# Patient Record
Sex: FEMALE | Race: WHITE | NOT HISPANIC OR LATINO | Employment: FULL TIME | URBAN - METROPOLITAN AREA
[De-identification: names, ages, dates, MRNs, and addresses within clinical notes are randomized per-mention and may not be internally consistent; named-entity substitution may affect disease eponyms.]

---

## 2018-01-22 PROBLEM — O16.9 HYPERTENSION AFFECTING PREGNANCY: Status: ACTIVE | Noted: 2018-01-22

## 2018-01-24 PROBLEM — O14.90 PRE-ECLAMPSIA: Status: ACTIVE | Noted: 2018-01-24

## 2018-02-27 PROBLEM — O26.899 ABDOMINAL PAIN AFFECTING PREGNANCY: Status: ACTIVE | Noted: 2018-02-27

## 2018-02-27 PROBLEM — R10.9 ABDOMINAL PAIN AFFECTING PREGNANCY: Status: ACTIVE | Noted: 2018-02-27

## 2018-03-26 PROBLEM — Z34.90 TERM PREGNANCY: Status: ACTIVE | Noted: 2018-03-26

## 2018-03-26 PROBLEM — E66.01 MORBID OBESITY: Status: ACTIVE | Noted: 2018-03-26

## 2018-05-21 PROBLEM — N90.89 VULVAR LESION: Status: ACTIVE | Noted: 2018-05-01

## 2018-05-21 PROBLEM — Z30.2 ADMISSION FOR STERILIZATION: Status: ACTIVE | Noted: 2018-05-21

## 2021-11-08 ENCOUNTER — OFFICE VISIT (OUTPATIENT)
Dept: ORTHOPEDICS | Facility: CLINIC | Age: 31
End: 2021-11-08
Payer: MEDICAID

## 2021-11-08 VITALS
HEART RATE: 103 BPM | HEIGHT: 70 IN | DIASTOLIC BLOOD PRESSURE: 88 MMHG | SYSTOLIC BLOOD PRESSURE: 138 MMHG | BODY MASS INDEX: 41.95 KG/M2 | WEIGHT: 293 LBS

## 2021-11-08 DIAGNOSIS — M23.92 INTERNAL DERANGEMENT OF LEFT KNEE: Primary | ICD-10-CM

## 2021-11-08 PROCEDURE — 99204 OFFICE O/P NEW MOD 45 MIN: CPT | Mod: S$PBB,,, | Performed by: ORTHOPAEDIC SURGERY

## 2021-11-08 PROCEDURE — 99999 PR PBB SHADOW E&M-EST. PATIENT-LVL III: ICD-10-PCS | Mod: PBBFAC,,, | Performed by: ORTHOPAEDIC SURGERY

## 2021-11-08 PROCEDURE — 99204 PR OFFICE/OUTPT VISIT, NEW, LEVL IV, 45-59 MIN: ICD-10-PCS | Mod: S$PBB,,, | Performed by: ORTHOPAEDIC SURGERY

## 2021-11-08 PROCEDURE — 99213 OFFICE O/P EST LOW 20 MIN: CPT | Mod: PBBFAC,PN | Performed by: ORTHOPAEDIC SURGERY

## 2021-11-08 PROCEDURE — 99999 PR PBB SHADOW E&M-EST. PATIENT-LVL III: CPT | Mod: PBBFAC,,, | Performed by: ORTHOPAEDIC SURGERY

## 2021-11-08 RX ORDER — HYDROCODONE BITARTRATE AND ACETAMINOPHEN 5; 325 MG/1; MG/1
1 TABLET ORAL EVERY 6 HOURS
COMMUNITY
Start: 2021-11-05 | End: 2021-12-06

## 2021-11-24 ENCOUNTER — OFFICE VISIT (OUTPATIENT)
Dept: ORTHOPEDICS | Facility: CLINIC | Age: 31
End: 2021-11-24
Payer: MEDICAID

## 2021-11-24 ENCOUNTER — PATIENT MESSAGE (OUTPATIENT)
Dept: ORTHOPEDICS | Facility: CLINIC | Age: 31
End: 2021-11-24

## 2021-11-24 DIAGNOSIS — S83.512A RUPTURE OF ANTERIOR CRUCIATE LIGAMENT OF LEFT KNEE, INITIAL ENCOUNTER: Primary | ICD-10-CM

## 2021-11-24 DIAGNOSIS — S83.272A COMPLEX TEAR OF LATERAL MENISCUS OF LEFT KNEE AS CURRENT INJURY, INITIAL ENCOUNTER: ICD-10-CM

## 2021-11-24 PROCEDURE — 99214 OFFICE O/P EST MOD 30 MIN: CPT | Mod: 95,,, | Performed by: ORTHOPAEDIC SURGERY

## 2021-11-24 PROCEDURE — 99214 PR OFFICE/OUTPT VISIT, EST, LEVL IV, 30-39 MIN: ICD-10-PCS | Mod: 95,,, | Performed by: ORTHOPAEDIC SURGERY

## 2021-11-26 ENCOUNTER — TELEPHONE (OUTPATIENT)
Dept: ORTHOPEDICS | Facility: CLINIC | Age: 31
End: 2021-11-26
Payer: MEDICAID

## 2021-11-26 ENCOUNTER — PATIENT MESSAGE (OUTPATIENT)
Dept: NEUROLOGY | Facility: CLINIC | Age: 31
End: 2021-11-26
Payer: MEDICAID

## 2021-12-06 ENCOUNTER — TELEPHONE (OUTPATIENT)
Dept: ORTHOPEDICS | Facility: CLINIC | Age: 31
End: 2021-12-06
Payer: MEDICAID

## 2021-12-06 ENCOUNTER — OFFICE VISIT (OUTPATIENT)
Dept: ORTHOPEDICS | Facility: CLINIC | Age: 31
End: 2021-12-06
Payer: MEDICAID

## 2021-12-06 ENCOUNTER — HOSPITAL ENCOUNTER (OUTPATIENT)
Dept: RADIOLOGY | Facility: HOSPITAL | Age: 31
Discharge: HOME | End: 2021-12-06
Attending: ORTHOPAEDIC SURGERY
Payer: MEDICAID

## 2021-12-06 VITALS
DIASTOLIC BLOOD PRESSURE: 84 MMHG | BODY MASS INDEX: 41.95 KG/M2 | HEIGHT: 70 IN | SYSTOLIC BLOOD PRESSURE: 139 MMHG | WEIGHT: 293 LBS | HEART RATE: 88 BPM

## 2021-12-06 DIAGNOSIS — Z01.818 PREOP EXAMINATION: ICD-10-CM

## 2021-12-06 DIAGNOSIS — S83.262D PERIPHERAL TEAR OF LATERAL MENISCUS OF LEFT KNEE AS CURRENT INJURY, SUBSEQUENT ENCOUNTER: ICD-10-CM

## 2021-12-06 DIAGNOSIS — S83.512D RUPTURE OF ANTERIOR CRUCIATE LIGAMENT OF LEFT KNEE, SUBSEQUENT ENCOUNTER: Primary | ICD-10-CM

## 2021-12-06 DIAGNOSIS — M25.562 LEFT KNEE PAIN, UNSPECIFIED CHRONICITY: Primary | ICD-10-CM

## 2021-12-06 PROCEDURE — 99999 PR PBB SHADOW E&M-EST. PATIENT-LVL IV: ICD-10-PCS | Mod: PBBFAC,,, | Performed by: ORTHOPAEDIC SURGERY

## 2021-12-06 PROCEDURE — 99214 OFFICE O/P EST MOD 30 MIN: CPT | Mod: S$PBB,,, | Performed by: ORTHOPAEDIC SURGERY

## 2021-12-06 PROCEDURE — 99214 PR OFFICE/OUTPT VISIT, EST, LEVL IV, 30-39 MIN: ICD-10-PCS | Mod: S$PBB,,, | Performed by: ORTHOPAEDIC SURGERY

## 2021-12-06 PROCEDURE — 71045 X-RAY EXAM CHEST 1 VIEW: CPT | Mod: TC,FY

## 2021-12-06 PROCEDURE — 71045 X-RAY EXAM CHEST 1 VIEW: CPT | Mod: 26,,, | Performed by: STUDENT IN AN ORGANIZED HEALTH CARE EDUCATION/TRAINING PROGRAM

## 2021-12-06 PROCEDURE — 71045 XR CHEST 1 VIEW PRE-OP: ICD-10-PCS | Mod: 26,,, | Performed by: STUDENT IN AN ORGANIZED HEALTH CARE EDUCATION/TRAINING PROGRAM

## 2021-12-06 PROCEDURE — 99214 OFFICE O/P EST MOD 30 MIN: CPT | Mod: PBBFAC,25,PN | Performed by: ORTHOPAEDIC SURGERY

## 2021-12-06 PROCEDURE — 99999 PR PBB SHADOW E&M-EST. PATIENT-LVL IV: CPT | Mod: PBBFAC,,, | Performed by: ORTHOPAEDIC SURGERY

## 2021-12-21 ENCOUNTER — ANESTHESIA EVENT (OUTPATIENT)
Dept: SURGERY | Facility: HOSPITAL | Age: 31
End: 2021-12-21
Payer: MEDICAID

## 2021-12-21 ENCOUNTER — HOSPITAL ENCOUNTER (OUTPATIENT)
Dept: PREADMISSION TESTING | Facility: HOSPITAL | Age: 31
Discharge: HOME | End: 2021-12-21
Attending: ORTHOPAEDIC SURGERY
Payer: MEDICAID

## 2021-12-21 VITALS — HEART RATE: 80 BPM | DIASTOLIC BLOOD PRESSURE: 71 MMHG | SYSTOLIC BLOOD PRESSURE: 118 MMHG | OXYGEN SATURATION: 94 %

## 2021-12-21 RX ORDER — SCOLOPAMINE TRANSDERMAL SYSTEM 1 MG/1
1 PATCH, EXTENDED RELEASE TRANSDERMAL
Status: CANCELLED | OUTPATIENT
Start: 2021-12-21

## 2021-12-21 RX ORDER — LIDOCAINE HYDROCHLORIDE 10 MG/ML
1 INJECTION, SOLUTION EPIDURAL; INFILTRATION; INTRACAUDAL; PERINEURAL ONCE
Status: CANCELLED | OUTPATIENT
Start: 2021-12-21 | End: 2021-12-21

## 2021-12-21 RX ORDER — LIDOCAINE HYDROCHLORIDE 10 MG/ML
1 INJECTION, SOLUTION EPIDURAL; INFILTRATION; INTRACAUDAL; PERINEURAL ONCE AS NEEDED
Status: CANCELLED | OUTPATIENT
Start: 2021-12-21 | End: 2033-05-19

## 2021-12-21 RX ORDER — SODIUM CHLORIDE, SODIUM LACTATE, POTASSIUM CHLORIDE, CALCIUM CHLORIDE 600; 310; 30; 20 MG/100ML; MG/100ML; MG/100ML; MG/100ML
INJECTION, SOLUTION INTRAVENOUS CONTINUOUS
Status: CANCELLED | OUTPATIENT
Start: 2021-12-21

## 2021-12-26 ENCOUNTER — LAB VISIT (OUTPATIENT)
Dept: FAMILY MEDICINE | Facility: CLINIC | Age: 31
End: 2021-12-26
Payer: MEDICAID

## 2021-12-26 DIAGNOSIS — Z01.818 PRE-OP TESTING: ICD-10-CM

## 2021-12-26 PROCEDURE — U0003 INFECTIOUS AGENT DETECTION BY NUCLEIC ACID (DNA OR RNA); SEVERE ACUTE RESPIRATORY SYNDROME CORONAVIRUS 2 (SARS-COV-2) (CORONAVIRUS DISEASE [COVID-19]), AMPLIFIED PROBE TECHNIQUE, MAKING USE OF HIGH THROUGHPUT TECHNOLOGIES AS DESCRIBED BY CMS-2020-01-R: HCPCS | Performed by: ORTHOPAEDIC SURGERY

## 2021-12-26 PROCEDURE — U0005 INFEC AGEN DETEC AMPLI PROBE: HCPCS | Performed by: ORTHOPAEDIC SURGERY

## 2021-12-27 LAB
SARS-COV-2 RNA RESP QL NAA+PROBE: NOT DETECTED
SARS-COV-2- CYCLE NUMBER: NORMAL

## 2021-12-27 NOTE — H&P
Subjective:   Ava Parikh is a 31 y.o. female who returns for evaluation and treatment of the left knee.     The patient has the following symptoms: giving out and pain located Anterior and lateral knee. The symptoms are not improving.  The patient was previously seen and evaluated for an ACL rupture.  Patient was sent for advanced imaging and this confirmed an ACL rupture with a lateral meniscus injury.  Today the patient complains of knee giving way episodes when pivoting on the left lower extremity.  This subsequently results and tremendous knee pain and swelling.  Since last office visit the patient has improved her knee range of motion and the swelling has decreased.        Outside reports reviewed: historical medical records and office notes.          Past Medical History:   Diagnosis Date    Admission for sterilization 05/2018    Gastroparesis 05/2018    History of pre-eclampsia      Hypertension      Vulvar lesion 05/2018             Patient Active Problem List   Diagnosis    Hypertension affecting pregnancy    Pre-eclampsia    Abdominal pain affecting pregnancy    Term pregnancy    Morbid obesity    Admission for sterilization    Vulvar lesion    Internal derangement of left knee    Left anterior cruciate ligament tear    Complex tear of lateral meniscus of left knee as current injury               Past Surgical History:   Procedure Laterality Date    ESOPHAGOGASTRODUODENOSCOPY         x 2    TONSILLECTOMY, ADENOIDECTOMY             No current outpatient medications            Review of patient's allergies indicates:   Allergen Reactions    Sulfamethoxazole-trimethoprim Rash       Other reaction(s): Hives         Social History               Socioeconomic History    Marital status: Single   Tobacco Use    Smoking status: Never Smoker    Smokeless tobacco: Never Used   Substance and Sexual Activity    Alcohol use: No    Drug use: No    Sexual activity: Yes       Partners: Male                   Family History   Problem Relation Age of Onset    Hypertension Mother      Asthma Mother            Review of Systems   Constitutional: Negative for chills and fever.   HENT: Negative for hearing loss.    Eyes: Negative for blurred vision.   Cardiovascular: Negative for chest pain.   Respiratory: Negative for shortness of breath.    Gastrointestinal: Negative for abdominal pain.   Neurological: Negative for light-headedness.         Objective:   General     Constitutional: She is oriented to person, place, and time. She appears well-developed and well-nourished.   HENT:   Head: Normocephalic and atraumatic.   Eyes: EOM are normal.   Cardiovascular: Normal rate.    Pulmonary/Chest: Effort normal.   Neurological: She is alert and oriented to person, place, and time.   Psychiatric: She has a normal mood and affect.               Right Knee Exam      Inspection   Erythema: absent  Swelling: absent  Effusion: absent     Tenderness   The patient is experiencing no tenderness.      Range of Motion   Extension: 0   Flexion: 130      Tests   Meniscus   [place]:  Medial - negative Lateral - negative  Ligament Examination Lachman: normal (-1 to 2mm) PCL-Posterior Drawer: normal (0 to 2mm)     MCL - Valgus: normal (0 to 2mm)  LCL - Varus: normal  Patella   Patellar apprehension: negative     Other   Sensation: normal     Left Knee Exam      Inspection   Erythema: absent  Swelling: absent  Effusion: present     Tenderness   The patient tender to palpation of the medial joint line.     Range of Motion   Extension: 0   Flexion: 100      Tests   Stability Lachman: abnormal   MCL - Valgus: normal (0 to 2mm)  LCL - Varus: normal (0 to 2mm)Pivot Shift: abnormal  Patella   Patellar apprehension: negative     Other   Sensation: normal     Muscle Strength   Right Lower Extremity   Quadriceps:  5/5   Hamstrin/5   Left Lower Extremity   Quadriceps:  5/5   Hamstrin/5      Vascular Exam      Right  Pulses     Posterior Tibial:      2+           Left Pulses     Posterior Tibial:      2+                 Imaging:  Radiographs of the left knee taken taken 10/30/2021 were personally reviewed from the [facility]MR.  Multiple views of the knee are available today for review, including a standing AP, a standing notch view, lateral view, and a merchant view.  The tibiofemoral compartment demonstrates minimal degenerative changes.  The patellofemoral compartment demonstrates minimal degenerative changes.  No acute fractures or dislocations are noted in these images.        MRI of the left knee taken taken 11/22/2021 was personally reviewed from the [facility].  Multiple T1 and T2 sequences including axial, coronal, and sagittal views were reviewed.  No acute fractures or dislocations are noted in these images.  The MRI demonstrates a empty lateral wall sign at the insertion of the ACL on the femur.  This is consistent with an ACL rupture.  The PCL is intact.  The medial meniscus is intact.  The lateral meniscus shows a degenerative tear at the body of the meniscus.  The medial and lateral collateral ligament complexes are intact.  The chondral surfaces are intact.  The quadriceps and patellar tendons are intact.  There is a trace effusion noted.     Procedures         Assessment:       Ava Parikh is a 31 y.o. female seen in the office today. The primary encounter diagnosis was Rupture of anterior cruciate ligament of left knee, subsequent encounter. Diagnoses of Preop examination and Peripheral tear of lateral meniscus of left knee as current injury, subsequent encounter were also pertinent to this visit.  Operative treatment with knee surgery is recommended at this time.  I had a long discussion with the patient regarding her knee problem.  Given her desire to maintain an active lifestyle I have recommended proceeding with left knee arthroscopic ACL reconstruction.  We discussed graft types and the  patient selected quadriceps tendon autograft.  We will evaluate the condition of the lateral meniscus tear intraoperatively and adjust her postoperative course as indicated.  The natural history and expected course discussed with patient. Various treatment options were discussed, including their risks and benefits. All of the patient's questions were answered.     Plan:   1. Tylenol 650mg TID, PRN pain.  2. Surgical treatment left ACL reconstruction with quad tendon autograft, possible meniscus surgery.  3. Surgical consent for surgery obtained today during office visit.  4. Expected date of surgery: 12/28/2021.  5. Patient will have her primary care physician send us her recent laboratory studies prior to surgery.  6. Post operative physical therapy ordered.

## 2021-12-28 ENCOUNTER — ANESTHESIA (OUTPATIENT)
Dept: SURGERY | Facility: HOSPITAL | Age: 31
End: 2021-12-28
Payer: MEDICAID

## 2021-12-28 ENCOUNTER — HOSPITAL ENCOUNTER (OUTPATIENT)
Facility: HOSPITAL | Age: 31
Discharge: HOME | End: 2021-12-28
Attending: ORTHOPAEDIC SURGERY | Admitting: ORTHOPAEDIC SURGERY
Payer: MEDICAID

## 2021-12-28 VITALS
WEIGHT: 293 LBS | HEIGHT: 70 IN | OXYGEN SATURATION: 96 % | SYSTOLIC BLOOD PRESSURE: 131 MMHG | HEART RATE: 97 BPM | RESPIRATION RATE: 19 BRPM | TEMPERATURE: 98 F | DIASTOLIC BLOOD PRESSURE: 76 MMHG | BODY MASS INDEX: 41.95 KG/M2

## 2021-12-28 DIAGNOSIS — S83.272A COMPLEX TEAR OF LATERAL MENISCUS OF LEFT KNEE AS CURRENT INJURY, INITIAL ENCOUNTER: ICD-10-CM

## 2021-12-28 DIAGNOSIS — S83.512A RUPTURE OF ANTERIOR CRUCIATE LIGAMENT OF LEFT KNEE, INITIAL ENCOUNTER: Primary | ICD-10-CM

## 2021-12-28 DIAGNOSIS — M22.42 CHONDROMALACIA, PATELLA, LEFT: ICD-10-CM

## 2021-12-28 DIAGNOSIS — E66.01 MORBID OBESITY: ICD-10-CM

## 2021-12-28 DIAGNOSIS — S83.512A LEFT ANTERIOR CRUCIATE LIGAMENT TEAR: ICD-10-CM

## 2021-12-28 LAB
B-HCG UR QL: NEGATIVE
CTP QC/QA: YES

## 2021-12-28 PROCEDURE — 01400 ANES OPN/ARTHRS KNEE JT NOS: CPT | Performed by: ORTHOPAEDIC SURGERY

## 2021-12-28 PROCEDURE — 71000033 HC RECOVERY, INTIAL HOUR: Performed by: ORTHOPAEDIC SURGERY

## 2021-12-28 PROCEDURE — 76942 ECHO GUIDE FOR BIOPSY: CPT | Performed by: STUDENT IN AN ORGANIZED HEALTH CARE EDUCATION/TRAINING PROGRAM

## 2021-12-28 PROCEDURE — 25000003 PHARM REV CODE 250: Performed by: ORTHOPAEDIC SURGERY

## 2021-12-28 PROCEDURE — 63600175 PHARM REV CODE 636 W HCPCS: Performed by: STUDENT IN AN ORGANIZED HEALTH CARE EDUCATION/TRAINING PROGRAM

## 2021-12-28 PROCEDURE — 37000008 HC ANESTHESIA 1ST 15 MINUTES: Performed by: ORTHOPAEDIC SURGERY

## 2021-12-28 PROCEDURE — 27201423 OPTIME MED/SURG SUP & DEVICES STERILE SUPPLY: Performed by: ORTHOPAEDIC SURGERY

## 2021-12-28 PROCEDURE — C9290 INJ, BUPIVACAINE LIPOSOME: HCPCS | Performed by: ORTHOPAEDIC SURGERY

## 2021-12-28 PROCEDURE — 25000003 PHARM REV CODE 250

## 2021-12-28 PROCEDURE — 63600175 PHARM REV CODE 636 W HCPCS: Performed by: NURSE PRACTITIONER

## 2021-12-28 PROCEDURE — 25000003 PHARM REV CODE 250: Performed by: NURSE PRACTITIONER

## 2021-12-28 PROCEDURE — C1713 ANCHOR/SCREW BN/BN,TIS/BN: HCPCS | Performed by: ORTHOPAEDIC SURGERY

## 2021-12-28 PROCEDURE — 37000009 HC ANESTHESIA EA ADD 15 MINS: Performed by: ORTHOPAEDIC SURGERY

## 2021-12-28 PROCEDURE — 25000003 PHARM REV CODE 250: Performed by: STUDENT IN AN ORGANIZED HEALTH CARE EDUCATION/TRAINING PROGRAM

## 2021-12-28 PROCEDURE — 81025 URINE PREGNANCY TEST: CPT | Performed by: ORTHOPAEDIC SURGERY

## 2021-12-28 PROCEDURE — 63600175 PHARM REV CODE 636 W HCPCS

## 2021-12-28 PROCEDURE — 63600175 PHARM REV CODE 636 W HCPCS: Performed by: ORTHOPAEDIC SURGERY

## 2021-12-28 PROCEDURE — 36000711: Performed by: ORTHOPAEDIC SURGERY

## 2021-12-28 PROCEDURE — 29888 ARTHRS AID ACL RPR/AGMNTJ: CPT | Mod: 22,LT,, | Performed by: ORTHOPAEDIC SURGERY

## 2021-12-28 PROCEDURE — 71000039 HC RECOVERY, EACH ADD'L HOUR: Performed by: ORTHOPAEDIC SURGERY

## 2021-12-28 PROCEDURE — 01400 ANES OPN/ARTHRS KNEE JT NOS: CPT | Performed by: STUDENT IN AN ORGANIZED HEALTH CARE EDUCATION/TRAINING PROGRAM

## 2021-12-28 PROCEDURE — 36000710: Performed by: ORTHOPAEDIC SURGERY

## 2021-12-28 PROCEDURE — 71000016 HC POSTOP RECOV ADDL HR: Performed by: ORTHOPAEDIC SURGERY

## 2021-12-28 PROCEDURE — 71000015 HC POSTOP RECOV 1ST HR: Performed by: ORTHOPAEDIC SURGERY

## 2021-12-28 PROCEDURE — 29888 PR KNEE SCOPE,AID ANT CRUCIATE REPAIR: ICD-10-PCS | Mod: 22,LT,, | Performed by: ORTHOPAEDIC SURGERY

## 2021-12-28 DEVICE — TIGHTROPE ACL RT: Type: IMPLANTABLE DEVICE | Site: KNEE | Status: FUNCTIONAL

## 2021-12-28 DEVICE — BUTTON TIGHTROPE ABS RND 20MM: Type: IMPLANTABLE DEVICE | Site: KNEE | Status: FUNCTIONAL

## 2021-12-28 DEVICE — SYS SWIVELOCK ANCH 4.75X19.1MM: Type: IMPLANTABLE DEVICE | Site: KNEE | Status: FUNCTIONAL

## 2021-12-28 RX ORDER — BUPIVACAINE HYDROCHLORIDE 5 MG/ML
INJECTION, SOLUTION PERINEURAL
Status: DISCONTINUED | OUTPATIENT
Start: 2021-12-28 | End: 2021-12-28 | Stop reason: HOSPADM

## 2021-12-28 RX ORDER — NEOSTIGMINE METHYLSULFATE 1 MG/ML
INJECTION, SOLUTION INTRAVENOUS
Status: DISCONTINUED | OUTPATIENT
Start: 2021-12-28 | End: 2021-12-28

## 2021-12-28 RX ORDER — GABAPENTIN 300 MG/1
300 CAPSULE ORAL NIGHTLY
Qty: 7 CAPSULE | Refills: 0 | Status: SHIPPED | OUTPATIENT
Start: 2021-12-28 | End: 2022-01-12

## 2021-12-28 RX ORDER — PREGABALIN 75 MG/1
300 CAPSULE ORAL
Status: COMPLETED | OUTPATIENT
Start: 2021-12-28 | End: 2021-12-28

## 2021-12-28 RX ORDER — LIDOCAINE HYDROCHLORIDE AND EPINEPHRINE 10; 10 MG/ML; UG/ML
INJECTION, SOLUTION INFILTRATION; PERINEURAL
Status: DISCONTINUED | OUTPATIENT
Start: 2021-12-28 | End: 2021-12-28 | Stop reason: HOSPADM

## 2021-12-28 RX ORDER — OXYCODONE HYDROCHLORIDE 5 MG/1
5 TABLET ORAL EVERY 4 HOURS PRN
Qty: 30 TABLET | Refills: 0 | Status: SHIPPED | OUTPATIENT
Start: 2021-12-28 | End: 2022-01-12

## 2021-12-28 RX ORDER — PHENYLEPHRINE HYDROCHLORIDE 10 MG/ML
INJECTION INTRAVENOUS
Status: DISCONTINUED | OUTPATIENT
Start: 2021-12-28 | End: 2021-12-28

## 2021-12-28 RX ORDER — ASPIRIN 81 MG/1
81 TABLET ORAL DAILY
Qty: 30 TABLET | Refills: 0 | Status: SHIPPED | OUTPATIENT
Start: 2021-12-28 | End: 2022-02-09

## 2021-12-28 RX ORDER — SCOLOPAMINE TRANSDERMAL SYSTEM 1 MG/1
1 PATCH, EXTENDED RELEASE TRANSDERMAL
Status: DISCONTINUED | OUTPATIENT
Start: 2021-12-28 | End: 2021-12-28 | Stop reason: HOSPADM

## 2021-12-28 RX ORDER — ROCURONIUM BROMIDE 10 MG/ML
INJECTION, SOLUTION INTRAVENOUS
Status: DISCONTINUED | OUTPATIENT
Start: 2021-12-28 | End: 2021-12-28

## 2021-12-28 RX ORDER — FENTANYL CITRATE 50 UG/ML
INJECTION, SOLUTION INTRAMUSCULAR; INTRAVENOUS
Status: DISCONTINUED | OUTPATIENT
Start: 2021-12-28 | End: 2021-12-28

## 2021-12-28 RX ORDER — OXYCODONE HYDROCHLORIDE 5 MG/1
5 TABLET ORAL
Status: DISCONTINUED | OUTPATIENT
Start: 2021-12-28 | End: 2021-12-28 | Stop reason: HOSPADM

## 2021-12-28 RX ORDER — MIDAZOLAM HYDROCHLORIDE 1 MG/ML
INJECTION, SOLUTION INTRAMUSCULAR; INTRAVENOUS
Status: DISCONTINUED | OUTPATIENT
Start: 2021-12-28 | End: 2021-12-28

## 2021-12-28 RX ORDER — HYDROMORPHONE HYDROCHLORIDE 2 MG/ML
INJECTION, SOLUTION INTRAMUSCULAR; INTRAVENOUS; SUBCUTANEOUS
Status: COMPLETED
Start: 2021-12-28 | End: 2021-12-28

## 2021-12-28 RX ORDER — OXYCODONE HYDROCHLORIDE 5 MG/1
TABLET ORAL
Status: COMPLETED
Start: 2021-12-28 | End: 2021-12-28

## 2021-12-28 RX ORDER — DIPHENHYDRAMINE HYDROCHLORIDE 50 MG/ML
12.5 INJECTION INTRAMUSCULAR; INTRAVENOUS EVERY 6 HOURS PRN
Status: DISCONTINUED | OUTPATIENT
Start: 2021-12-28 | End: 2021-12-28 | Stop reason: HOSPADM

## 2021-12-28 RX ORDER — SODIUM CHLORIDE, SODIUM LACTATE, POTASSIUM CHLORIDE, CALCIUM CHLORIDE 600; 310; 30; 20 MG/100ML; MG/100ML; MG/100ML; MG/100ML
INJECTION, SOLUTION INTRAVENOUS CONTINUOUS
Status: DISCONTINUED | OUTPATIENT
Start: 2021-12-28 | End: 2021-12-28 | Stop reason: HOSPADM

## 2021-12-28 RX ORDER — SODIUM CHLORIDE 0.9 % (FLUSH) 0.9 %
10 SYRINGE (ML) INJECTION
Status: DISCONTINUED | OUTPATIENT
Start: 2021-12-28 | End: 2021-12-28 | Stop reason: HOSPADM

## 2021-12-28 RX ORDER — LIDOCAINE HYDROCHLORIDE 20 MG/ML
INJECTION INTRAVENOUS
Status: DISCONTINUED | OUTPATIENT
Start: 2021-12-28 | End: 2021-12-28

## 2021-12-28 RX ORDER — ONDANSETRON 2 MG/ML
4 INJECTION INTRAMUSCULAR; INTRAVENOUS DAILY PRN
Status: DISCONTINUED | OUTPATIENT
Start: 2021-12-28 | End: 2021-12-28 | Stop reason: HOSPADM

## 2021-12-28 RX ORDER — LIDOCAINE HYDROCHLORIDE 10 MG/ML
1 INJECTION, SOLUTION EPIDURAL; INFILTRATION; INTRACAUDAL; PERINEURAL ONCE
Status: DISCONTINUED | OUTPATIENT
Start: 2021-12-28 | End: 2021-12-28 | Stop reason: HOSPADM

## 2021-12-28 RX ORDER — ACETAMINOPHEN 500 MG
1000 TABLET ORAL
Status: COMPLETED | OUTPATIENT
Start: 2021-12-28 | End: 2021-12-28

## 2021-12-28 RX ORDER — CEFAZOLIN SODIUM 2 G/50ML
2 SOLUTION INTRAVENOUS
Status: DISCONTINUED | OUTPATIENT
Start: 2021-12-28 | End: 2021-12-28 | Stop reason: HOSPADM

## 2021-12-28 RX ORDER — CEFAZOLIN SODIUM 1 G/3ML
INJECTION, POWDER, FOR SOLUTION INTRAMUSCULAR; INTRAVENOUS
Status: DISCONTINUED | OUTPATIENT
Start: 2021-12-28 | End: 2021-12-28

## 2021-12-28 RX ORDER — DEXAMETHASONE SODIUM PHOSPHATE 4 MG/ML
INJECTION, SOLUTION INTRA-ARTICULAR; INTRALESIONAL; INTRAMUSCULAR; INTRAVENOUS; SOFT TISSUE
Status: DISCONTINUED | OUTPATIENT
Start: 2021-12-28 | End: 2021-12-28

## 2021-12-28 RX ORDER — ACETAMINOPHEN 500 MG
1000 TABLET ORAL EVERY 8 HOURS
Qty: 42 TABLET | Refills: 0 | Status: SHIPPED | OUTPATIENT
Start: 2021-12-28 | End: 2022-01-04

## 2021-12-28 RX ORDER — CELECOXIB 100 MG/1
200 CAPSULE ORAL
Status: COMPLETED | OUTPATIENT
Start: 2021-12-28 | End: 2021-12-28

## 2021-12-28 RX ORDER — HYDROMORPHONE HYDROCHLORIDE 2 MG/ML
0.5 INJECTION, SOLUTION INTRAMUSCULAR; INTRAVENOUS; SUBCUTANEOUS EVERY 5 MIN PRN
Status: DISCONTINUED | OUTPATIENT
Start: 2021-12-28 | End: 2021-12-28 | Stop reason: HOSPADM

## 2021-12-28 RX ORDER — SODIUM CHLORIDE 9 MG/ML
INJECTION, SOLUTION INTRAVENOUS CONTINUOUS
Status: DISCONTINUED | OUTPATIENT
Start: 2021-12-28 | End: 2021-12-28 | Stop reason: HOSPADM

## 2021-12-28 RX ORDER — ACETAMINOPHEN 500 MG
1000 TABLET ORAL EVERY 8 HOURS
Qty: 42 TABLET | Refills: 0 | Status: SHIPPED | OUTPATIENT
Start: 2021-12-28 | End: 2021-12-28 | Stop reason: SDUPTHER

## 2021-12-28 RX ORDER — LIDOCAINE HYDROCHLORIDE 10 MG/ML
1 INJECTION, SOLUTION EPIDURAL; INFILTRATION; INTRACAUDAL; PERINEURAL ONCE AS NEEDED
Status: DISCONTINUED | OUTPATIENT
Start: 2021-12-28 | End: 2021-12-28 | Stop reason: HOSPADM

## 2021-12-28 RX ORDER — ROPIVACAINE HYDROCHLORIDE 2 MG/ML
INJECTION, SOLUTION EPIDURAL; INFILTRATION; PERINEURAL
Status: DISCONTINUED | OUTPATIENT
Start: 2021-12-28 | End: 2021-12-28

## 2021-12-28 RX ORDER — CELECOXIB 200 MG/1
200 CAPSULE ORAL 2 TIMES DAILY
Qty: 28 CAPSULE | Refills: 0 | Status: SHIPPED | OUTPATIENT
Start: 2021-12-28 | End: 2022-01-03

## 2021-12-28 RX ORDER — KETOROLAC TROMETHAMINE 30 MG/ML
INJECTION, SOLUTION INTRAMUSCULAR; INTRAVENOUS
Status: DISCONTINUED | OUTPATIENT
Start: 2021-12-28 | End: 2021-12-28

## 2021-12-28 RX ORDER — PROPOFOL 10 MG/ML
VIAL (ML) INTRAVENOUS
Status: DISCONTINUED | OUTPATIENT
Start: 2021-12-28 | End: 2021-12-28

## 2021-12-28 RX ADMIN — PROPOFOL 200 MG: 10 INJECTION, EMULSION INTRAVENOUS at 10:12

## 2021-12-28 RX ADMIN — ONDANSETRON 4 MG: 2 INJECTION, SOLUTION INTRAMUSCULAR; INTRAVENOUS at 02:12

## 2021-12-28 RX ADMIN — SODIUM CHLORIDE, SODIUM LACTATE, POTASSIUM CHLORIDE, AND CALCIUM CHLORIDE: .6; .31; .03; .02 INJECTION, SOLUTION INTRAVENOUS at 09:12

## 2021-12-28 RX ADMIN — CEFAZOLIN 3 G: 330 INJECTION, POWDER, FOR SOLUTION INTRAMUSCULAR; INTRAVENOUS at 10:12

## 2021-12-28 RX ADMIN — KETOROLAC TROMETHAMINE 30 MG: 30 INJECTION, SOLUTION INTRAMUSCULAR; INTRAVENOUS at 02:12

## 2021-12-28 RX ADMIN — GLYCOPYRROLATE 0.3 MG: 0.2 INJECTION, SOLUTION INTRAMUSCULAR; INTRAVITREAL at 02:12

## 2021-12-28 RX ADMIN — PREGABALIN 300 MG: 75 CAPSULE ORAL at 07:12

## 2021-12-28 RX ADMIN — FENTANYL CITRATE 50 MCG: 50 INJECTION, SOLUTION INTRAMUSCULAR; INTRAVENOUS at 02:12

## 2021-12-28 RX ADMIN — HYDROMORPHONE HYDROCHLORIDE: 2 INJECTION INTRAMUSCULAR; INTRAVENOUS; SUBCUTANEOUS at 03:12

## 2021-12-28 RX ADMIN — ROPIVACAINE HYDROCHLORIDE 20 ML: 2 INJECTION, SOLUTION EPIDURAL; INFILTRATION at 10:12

## 2021-12-28 RX ADMIN — PHENYLEPHRINE HYDROCHLORIDE 200 MCG: 10 INJECTION INTRAVENOUS at 10:12

## 2021-12-28 RX ADMIN — FENTANYL CITRATE 50 MCG: 50 INJECTION, SOLUTION INTRAMUSCULAR; INTRAVENOUS at 01:12

## 2021-12-28 RX ADMIN — ROCURONIUM BROMIDE 50 MG: 10 INJECTION, SOLUTION INTRAVENOUS at 10:12

## 2021-12-28 RX ADMIN — ACETAMINOPHEN 1000 MG: 500 TABLET ORAL at 07:12

## 2021-12-28 RX ADMIN — CELECOXIB 200 MG: 100 CAPSULE ORAL at 07:12

## 2021-12-28 RX ADMIN — ROCURONIUM BROMIDE 15 MG: 10 INJECTION, SOLUTION INTRAVENOUS at 11:12

## 2021-12-28 RX ADMIN — SCOPALAMINE 1 PATCH: 1 PATCH, EXTENDED RELEASE TRANSDERMAL at 07:12

## 2021-12-28 RX ADMIN — OXYCODONE HYDROCHLORIDE: 5 TABLET ORAL at 04:12

## 2021-12-28 RX ADMIN — PHENYLEPHRINE HYDROCHLORIDE 100 MCG: 10 INJECTION INTRAVENOUS at 11:12

## 2021-12-28 RX ADMIN — CEFAZOLIN 3 G: 330 INJECTION, POWDER, FOR SOLUTION INTRAMUSCULAR; INTRAVENOUS at 01:12

## 2021-12-28 RX ADMIN — PHENYLEPHRINE HYDROCHLORIDE 50 MCG: 10 INJECTION INTRAVENOUS at 12:12

## 2021-12-28 RX ADMIN — HYDROMORPHONE HYDROCHLORIDE 0.5 MG: 2 INJECTION INTRAMUSCULAR; INTRAVENOUS; SUBCUTANEOUS at 03:12

## 2021-12-28 RX ADMIN — DEXAMETHASONE SODIUM PHOSPHATE 8 MG: 4 INJECTION, SOLUTION INTRA-ARTICULAR; INTRALESIONAL; INTRAMUSCULAR; INTRAVENOUS; SOFT TISSUE at 10:12

## 2021-12-28 RX ADMIN — FENTANYL CITRATE 25 MCG: 50 INJECTION, SOLUTION INTRAMUSCULAR; INTRAVENOUS at 10:12

## 2021-12-28 RX ADMIN — NEOSTIGMINE METHYLSULFATE 2.5 MG: 1 INJECTION INTRAVENOUS at 02:12

## 2021-12-28 RX ADMIN — PHENYLEPHRINE HYDROCHLORIDE 100 MCG: 10 INJECTION INTRAVENOUS at 10:12

## 2021-12-28 RX ADMIN — ROCURONIUM BROMIDE 20 MG: 10 INJECTION, SOLUTION INTRAVENOUS at 12:12

## 2021-12-28 RX ADMIN — MIDAZOLAM 5 MG: 1 INJECTION INTRAMUSCULAR; INTRAVENOUS at 10:12

## 2021-12-28 RX ADMIN — LIDOCAINE HYDROCHLORIDE 100 MG: 20 INJECTION, SOLUTION INTRAVENOUS at 10:12

## 2021-12-28 RX ADMIN — FENTANYL CITRATE 75 MCG: 50 INJECTION, SOLUTION INTRAMUSCULAR; INTRAVENOUS at 10:12

## 2021-12-28 NOTE — PLAN OF CARE
Patient getting dressed with significant other, will given instructions and discharge soon, vss, no issues

## 2021-12-28 NOTE — PATIENT INSTRUCTIONS
"Time Line After ACL Reconstruction     Day 1-2 after surgery   Begin home knee exercises (see sheet "Dr. De Guzman Knee Exercises"), perform 3 times daily  It is okay to put weight on your operative leg after surgery; keep your brace on while walking; use crutches as needed    Day 1-2 after surgery   Continue crutches until they are discontinued at therapy  Continue wearing brace     Day 3 after surgery  Remove the post-operative dressing, cover incisions with waterproof bandaids or dry sterile gauze    You may shower, but keep incisions dry and covered with waterproof bandaids    Day 10-14   Follow up with surgeon for suture removal     Dr. De Guzman Knee Exercises   **Perform these exercises 3 times a day starting day 1 or 2 after surgery**    1. Ankle pumps: With your leg straight, bend your ankle up (toes  pointing straight up) and down (toes pointing straight out ahead of  you). Do 10 repetitions. Also, spell out the alphabet (A, B, C, D, etc)  forward and backward using your big toe as the pen or pencil.      2. Straight leg raises: With your leg straight, lift up your leg off the  bed about 2 feet (24 inches), then slowly bring the straight leg back  down to the bed. You should use your front thigh muscles (quad  muscles) to raise the leg.      3. Quad sets: With your leg straight out and your foot and ankle  resting on a rolled towel, tighten the front of your thigh (quad  muscles) and try to push the back of your knee flat down towards  the bed. Hold the leg in this position for 10 seconds, then relax.      4. Gluteal squeezes: While laying flat on your back, squeeze your butt  muscles (gluteals) together and hold together for 10 seconds, then  relax.        Pain Medications  -You were given a nerve block in conjunction with your surgical procedure.  This nerve block will wear off after surgery.  When you begin feeling pain it is important for you to take your pain medication as directed.    You are " prescribed the following medications; it is important to take the medications as prescribed.   -Acetaminophen 1000mg every 8 hours.  Start taking this medication as soon as you get home even if you don't have pain, then continue taking as directed.  -Celebrex 200mg twice daily.  Take your first dose as soon as you get home, then continue taking as directed.   -Gabapentin 300mg before bedtime.  Take your first dose the evening of surgery.  -Oxycodone 5mg every 6 hours AS NEEDED ONLY.  Take your first dose when you begin feeling knee pain when your nerve block wears off.  After this first dose, only take this medication if your pain is extreme.  We will not refill this medication after surgery because it is a Narcotic Pain medication.      -Aspirin 81mg daily.  Take this medication the night of surgery, then continue taking daily for 30 days to reduce risk of blood clot after surgery.

## 2021-12-28 NOTE — DISCHARGE INSTRUCTIONS
ANESTHESIA  -For the first 24 hours after surgery:  Do not drive, use heavy equipment, make important decisions, or drink alcohol  -It is normal to feel sleepy for several hours.  Rest until you are more awake.  -Have someone stay with you, if needed.  They can watch for problems and help keep you safe.  -Some possible post anesthesia side effects include: nausea and vomiting, sore throat and hoarseness, sleepiness, and dizziness.    PAIN  -If you have pain after surgery, pain medicine will help you feel better.  Take it as directed, before pain becomes severe.  Most pain relievers taken by mouth need at least 20-30 minutes to start working.  -Do not drive or drink alcohol while taking pain medicine.  -Pain medication can upset your stomach.  Taking them with a little food may help.  -Other ways to help control pain: elevation, ice, and relaxation  -Call your surgeon if still having unmanageable pain an hour after taking pain medicine.  -Pain medicine can cause constipation.  Taking an over-the counter stool softener while on prescription pain medicine and drinking plenty of fluids can prevent this side effect.  -Call your surgeon if you have severe side effects like: breathing problems, trouble waking up, dizziness, confusion, or severe constipation.    NAUSEA  -Some people have nausea after surgery.  This is often because of anesthesia, pain, pain medicine, or the stress of surgery.  -Do not push yourself to eat.  Start off with clear liquids and soup.  Slowly move to solid foods.  Don't eat fatty, rich, spicy foods at first.  Eat smaller amounts.  -If you develop persistent nausea and vomiting please notify your surgeon immediately.    BLEEDING  -Different types of surgery require different types of care and dressing changes.  It is important to follow all instructions and advice from your surgeon.  Change dressing as directed.  Call your surgeon for any concerns regarding postop bleeding.    SIGNS OF  INFECTION  -Signs of infection include: fever, swelling, drainage, and redness  -Notify your surgeon if you have a fever of 100.4 F (38.0 C) or higher.  -Notify your surgeon if you notice redness, swelling, increased pain, pus, or a foul smell at the incision site.  Patient Education       Oxycodone (oks i KOE done)   Brand Names: US Oxaydo; OxyCONTIN; Roxicodone; Xtampza ER   Brand Names: Emanuel ACT Oxycodone CR [DSC]; APO-Oxycodone CR; Oxy-IR; OxyNEO; PMS-OxyCODONE; PMS-OxyCODONE CR; Supeudol; Supeudol 10; Supeudol 20   Warning   All products:   · This drug is a strong pain drug that can put you at risk for addiction, abuse, and misuse. Misuse or abuse of this drug can lead to overdose and death. Talk with your doctor.  · You will be watched closely to make sure you do not misuse, abuse, or become addicted to this drug.  · This drug may cause very bad and sometimes deadly breathing problems. Call your doctor right away if you have slow, shallow, or trouble breathing.  · The chance of very bad and sometimes deadly breathing problems may be greater when you first start this drug or anytime your dose is raised.  · Even one dose of this drug may be deadly if it is taken by someone else or by accident, especially in children. If this drug is taken by someone else or by accident, get medical help right away.  · Keep all drugs in a safe place. Keep all drugs out of the reach of children and pets.  · Using this drug for a long time during pregnancy may lead to withdrawal in the  baby. This can be life-threatening. Talk with the doctor.  · This drug has an opioid drug in it. Severe side effects have happened when opioid drugs were used with benzodiazepines, alcohol, marijuana or other forms of cannabis, or prescription or OTC drugs that may cause drowsiness or slowed actions. This includes slow or troubled breathing and death. Benzodiazepines include drugs like alprazolam, diazepam, and lorazepam. Benzodiazepines  may be used to treat many health problems like anxiety, trouble sleeping, or seizures. If you have questions, talk with the doctor.  · Many drugs interact with this drug and can raise the chance of side effects like deadly breathing problems. Talk with your doctor and pharmacist to make sure it is safe to use this drug with all of your drugs.  · Do not take with alcohol or products that have alcohol. Unsafe and sometimes deadly effects may happen.  · Get medical help right away if you feel very sleepy, very dizzy, or if you pass out. Caregivers or others need to get medical help right away if the patient does not respond, does not answer or react like normal, or will not wake up.  All long-acting products:   · Swallow whole. Do not chew, break, crush, or dissolve before swallowing. Doing these things can cause very bad side effects and death.  All liquid products:   · Make sure you have the right drug; there is more than one strength. A lower strength may not ease pain well enough. A higher strength could lead to accidental overdose and death.  · Be sure that you know how to measure your dose. Dosing errors can lead to accidental overdose and death. If you have any questions, talk with your doctor or pharmacist.  What is this drug used for?   · It is used to ease pain.    What do I need to tell my doctor BEFORE I take this drug?   · If you are allergic to this drug; any part of this drug; or any other drugs, foods, or substances. Tell your doctor about the allergy and what signs you had.  · If you have any of these health problems: Lung or breathing problems like asthma, trouble breathing, or sleep apnea; high levels of carbon dioxide in the blood; or stomach or bowel block or narrowing.  · If you have taken certain drugs for depression or Parkinson's disease in the last 14 days. This includes isocarboxazid, phenelzine, tranylcypromine, selegiline, or rasagiline. Very high blood pressure may happen.  · If you are  taking any of these drugs: Linezolid or methylene blue.  · If you are taking any of these drugs: Buprenorphine, butorphanol, nalbuphine, or pentazocine.  This is not a list of all drugs or health problems that interact with this drug.  Tell your doctor and pharmacist about all of your drugs (prescription or OTC, natural products, vitamins) and health problems. You must check to make sure that it is safe for you to take this drug with all of your drugs and health problems. Do not start, stop, or change the dose of any drug without checking with your doctor.  What are some things I need to know or do while I take this drug?   All products:   · Tell all of your health care providers that you take this drug. This includes your doctors, nurses, pharmacists, and dentists.  · Avoid driving and doing other tasks or actions that call for you to be alert until you see how this drug affects you.  · To lower the chance of feeling dizzy or passing out, rise slowly if you have been sitting or lying down. Be careful going up and down stairs.  · Do not take more than what your doctor told you to take. Taking more than you are told may raise your chance of very bad side effects.  · Do not take this drug with other strong pain drugs or if you are using a pain patch without talking to your doctor first.  · If you have been taking this drug for a long time or at high doses, it may not work as well and you may need higher doses to get the same effect. This is known as tolerance. Call your doctor if this drug stops working well. Do not take more than ordered.  · Long-term or regular use of opioid drugs like this drug may lead to dependence. Lowering the dose or stopping this drug all of a sudden may cause a greater risk of withdrawal or other severe problems. Talk to your doctor before you lower the dose or stop this drug. You will need to follow your doctors instructions. Tell your doctor if you have more pain, mood changes, thoughts  of suicide, or any other bad effects.  · This drug may raise the chance of seizures in some people, including people who have had seizures in the past. Talk to your doctor to see if you have a greater chance of seizures while taking this drug.  · If you are 65 or older, use this drug with care. You could have more side effects.  · This drug may cause harm to the unborn baby if you take it while you are pregnant. If you are pregnant or you get pregnant while taking this drug, call your doctor right away.  · Tell your doctor if you are breast-feeding or plan to breast-feed. This drug passes into breast milk and may harm your baby.  Long-acting and liquid products:   · Certain strengths of this drug may only be used by people who have been taking drugs like this drug and are used to their effects. The use of these strengths by people who have not been taking drugs like this drug may cause very bad and sometimes deadly breathing problems. Talk with the doctor.  Roxybond tablets:   · You may see something that looks like the tablet in your stool. This is normal and not a cause for concern. If you have questions, talk with your doctor.  What are some side effects that I need to call my doctor about right away?   WARNING/CAUTION: Even though it may be rare, some people may have very bad and sometimes deadly side effects when taking a drug. Tell your doctor or get medical help right away if you have any of the following signs or symptoms that may be related to a very bad side effect:  · Signs of an allergic reaction, like rash; hives; itching; red, swollen, blistered, or peeling skin with or without fever; wheezing; tightness in the chest or throat; trouble breathing, swallowing, or talking; unusual hoarseness; or swelling of the mouth, face, lips, tongue, or throat.  · Very bad dizziness or passing out.  · Feeling confused.  · Severe constipation or stomach pain. These may be signs of a severe bowel problem.  · Trouble  breathing, slow breathing, or shallow breathing.  · Noisy breathing.  · Breathing problems during sleep (sleep apnea).  · Trouble passing urine.  · Fast, slow, or abnormal heartbeat.  · Seizures.  · Shakiness.  · Change in eyesight.  · Chest pain or pressure.  · Hallucinations (seeing or hearing things that are not there).  · Mood changes.  · Memory problems or loss.  · Trouble walking.  · Trouble speaking.  · Swelling in the arms or legs.  · Fever.  · A severe and sometimes deadly problem called serotonin syndrome may happen if you take this drug with certain other drugs. Call your doctor right away if you have agitation; change in balance; confusion; hallucinations; fever; fast or abnormal heartbeat; flushing; muscle twitching or stiffness; seizures; shivering or shaking; sweating a lot; severe diarrhea, upset stomach, or throwing up; or severe headache.  · Taking an opioid drug like this drug may lead to a rare but very bad adrenal gland problem. Call your doctor right away if you have very bad dizziness or passing out, very bad upset stomach or throwing up, or if you feel less hungry, very tired, or very weak.  · Long-term use of an opioid drug may lead to lower sex hormone levels. Call your doctor if you have a lowered interest in sex, fertility problems, no menstrual period, or ejaculation problems.  What are some other side effects of this drug?   All drugs may cause side effects. However, many people have no side effects or only have minor side effects. Call your doctor or get medical help if any of these side effects or any other side effects bother you or do not go away:  · Feeling dizzy, sleepy, tired, or weak.  · Headache.  · Trouble sleeping.  · Itching.  · Dry mouth.  · Constipation, diarrhea, stomach pain, upset stomach, throwing up, or feeling less hungry.  These are not all of the side effects that may occur. If you have questions about side effects, call your doctor. Call your doctor for medical  advice about side effects.  You may report side effects to your national health agency.  You may report side effects to the FDA at 1-513.976.4691. You may also report side effects at https://www.fda.gov/medwatch.  How is this drug best taken?   Use this drug as ordered by your doctor. Read all information given to you. Follow all instructions closely.  All products:   · Take by mouth only.  · Do not inject or snort this drug. Doing any of these things can cause very bad side effects like trouble breathing and death from overdose.  All long-acting products:   · Swallow whole. Do not chew, break, crush, or dissolve before swallowing. Doing these things can cause very bad side effects and death.  · Do not use for fast pain relief or on an as needed basis.  · Do not use for pain relief after surgery if you have not been taking drugs like this drug.  Long-acting capsules:   · Take this drug with food. Always take with the same amount of food each time.  · You may sprinkle contents of the capsule on applesauce or other soft food. You may also sprinkle the contents of the capsule into a cup and put directly into the mouth. Do not chew. Swallow right away and rinse your mouth to make sure that all capsule contents were swallowed.  · Those who have feeding tubes may use this drug. Use as you have been told. Flush the feeding tube after this drug is given.  Oxaydo and Roxybond tablets, long-acting tablets:   · Take 1 tablet at a time if your dose is more than 1 tablet. Do not lick or wet the tablet before putting it in your mouth. Swallow the tablet with lots of water right after putting it in your mouth.  · If you have trouble swallowing, talk with your doctor.  · Do not put this drug down a feeding tube.  Oxaydo and Roxybond tablets:   · Swallow whole. Do not chew, break, or crush.  All liquid products:   · Measure liquid doses carefully. Use the measuring device that comes with this drug. If there is none, ask the  pharmacist for a device to measure this drug.  · Do not use a household teaspoon or tablespoon to measure this drug. Doing so could lead to the dose being too high.  What do I do if I miss a dose?   · Take a missed dose as soon as you think about it.  · If it is close to the time for your next dose, skip the missed dose and go back to your normal time.  · Do not take 2 doses at the same time or extra doses.  · Some products are to be taken to ease pain as needed. If you are taking this drug as needed, do not take more often than told by your doctor.    How do I store and/or throw out this drug?   · Store at room temperature protected from light. Store in a dry place. Do not store in a bathroom.  · Store this drug in a safe place where children cannot see or reach it, and where other people cannot get to it. A locked box or area may help keep this drug safe. Keep all drugs away from pets.  · Throw away unused or  drugs. Do not flush down a toilet or pour down a drain unless you are told to do so. Check with your pharmacist if you have questions about the best way to throw out drugs. There may be drug take-back programs in your area.    General drug facts   · If your symptoms or health problems do not get better or if they become worse, call your doctor.  · Do not share your drugs with others and do not take anyone else's drugs.  · Some drugs may have another patient information leaflet. If you have any questions about this drug, please talk with your doctor, nurse, pharmacist, or other health care provider.  · This drug comes with an extra patient fact sheet called a Medication Guide. Read it with care. Read it again each time this drug is refilled. If you have any questions about this drug, please talk with the doctor, pharmacist, or other health care provider.  · A drug called naloxone can be used to help treat an overdose of this drug. Your doctor may order naloxone for you to keep with you while you take  this drug. If you have questions about how to get or use naloxone, talk with your doctor or pharmacist. If you think there has been an overdose, get medical care right away even if naloxone has been used. Be ready to tell or show what was taken, how much, and when it happened.    Consumer Information Use and Disclaimer   This generalized information is a limited summary of diagnosis, treatment, and/or medication information. It is not meant to be comprehensive and should be used as a tool to help the user understand and/or assess potential diagnostic and treatment options. It does NOT include all information about conditions, treatments, medications, side effects, or risks that may apply to a specific patient. It is not intended to be medical advice or a substitute for the medical advice, diagnosis, or treatment of a health care provider based on the health care provider's examination and assessment of a patient's specific and unique circumstances. Patients must speak with a health care provider for complete information about their health, medical questions, and treatment options, including any risks or benefits regarding use of medications. This information does not endorse any treatments or medications as safe, effective, or approved for treating a specific patient. [employer] and its affiliates disclaim any warranty or liability relating to this information or the use thereof. The use of this information is governed by the Terms of Use, available at https://www.[employer].Mode[employer]/en/solutions/lexicomp/about/zoya.  Last Reviewed Date   2021-08-12  Copyright   © 2021 [employer] and its affiliates and/or licensors. All rights reserved.  Patient Education       Gabapentin (GA ba pen tin)   Brand Names: US Gralise; Neurontin   Brand Names: Emanuel ACT Gabapentin [DSC]; AG-Gabapentin; APO-Gabapentin; Auro-Gabapentin; BIO-Gabapentin; DOM-Gabapentin; GD-Gabapentin [DSC]; GLN-Gabapentin; JAMP-Gabapentin;  Mar-Gabapentin; MYLAN-Gabapentin [DSC]; Neurontin; PMS-Gabapentin; Priva-Gabapentin; PRO-Gabapentin; RAN-Gabapentin; LASHAE-Gabapentin; TARO-Gabapentin; TEVA-Gabapentin; VAN-Gabapentin [DSC]   What is this drug used for?   · It is used to treat painful nerve diseases.  · It is used to help control certain kinds of seizures.  · It may be given to you for other reasons. Talk with the doctor.    What do I need to tell my doctor BEFORE I take this drug?   · If you are allergic to this drug; any part of this drug; or any other drugs, foods, or substances. Tell your doctor about the allergy and what signs you had.  · If you have kidney disease or are on dialysis.  This is not a list of all drugs or health problems that interact with this drug.  Tell your doctor and pharmacist about all of your drugs (prescription or OTC, natural products, vitamins) and health problems. You must check to make sure that it is safe for you to take this drug with all of your drugs and health problems. Do not start, stop, or change the dose of any drug without checking with your doctor.  What are some things I need to know or do while I take this drug?   For all uses of this drug:   · Tell all of your health care providers that you take this drug. This includes your doctors, nurses, pharmacists, and dentists.  · Avoid driving and doing other tasks or actions that call for you to be alert until you see how this drug affects you.  · This drug may affect certain lab tests. Tell all of your health care providers and lab workers that you take this drug.  · Have blood work checked as you have been told by the doctor. Talk with the doctor.  · Talk with your doctor before you use alcohol, marijuana or other forms of cannabis, or prescription or OTC drugs that may slow your actions.  · This drug is not the same as gabapentin enacarbil (Horizant). Do not use in its place. Talk with the doctor.  · Do not stop taking this drug all of a sudden without  calling your doctor. You may have a greater risk of side effects. If you need to stop this drug, you will want to slowly stop it as ordered by your doctor.  · A severe and sometimes deadly reaction has happened. Most of the time, this reaction has signs like fever, rash, or swollen glands with problems in body organs like the liver, kidney, blood, heart, muscles and joints, or lungs. If you have questions, talk with the doctor.  · Severe breathing problems have happened with this drug in people taking certain other drugs (like opioid pain drugs). This has also happened in people who already have lung or breathing problems. The risk may also be greater in people who are older than 65. Sometimes, breathing problems have been deadly. If you have questions, talk with the doctor.  · If you are 65 or older, use this drug with care. You could have more side effects.  · If the patient is 3 to 12 years of age, use this drug with care. The risk of mood or behavior problems may be higher in these children.  · Tell your doctor if you are pregnant, plan on getting pregnant, or are breast-feeding. You will need to talk about the benefits and risks to you and the baby.  For seizures:   · If seizures are different or worse after starting this drug, talk with the doctor.  What are some side effects that I need to call my doctor about right away?   WARNING/CAUTION: Even though it may be rare, some people may have very bad and sometimes deadly side effects when taking a drug. Tell your doctor or get medical help right away if you have any of the following signs or symptoms that may be related to a very bad side effect:  · Signs of an allergic reaction, like rash; hives; itching; red, swollen, blistered, or peeling skin with or without fever; wheezing; tightness in the chest or throat; trouble breathing, swallowing, or talking; unusual hoarseness; or swelling of the mouth, face, lips, tongue, or throat.  · Signs of liver problems like  dark urine, feeling tired, not hungry, upset stomach or stomach pain, light-colored stools, throwing up, or yellow skin or eyes.  · Signs of kidney problems like unable to pass urine, change in how much urine is passed, blood in the urine, or a big weight gain.  · Trouble controlling body movements, twitching, change in balance, trouble swallowing or speaking.  · Memory problems or loss.  · Change in eyesight.  · Feeling confused, not able to focus, or change in behavior.  · Shakiness.  · Trouble breathing, slow breathing, or shallow breathing.  · Change in color of skin to a bluish color like on the lips, nail beds, fingers, or toes.  · Shortness of breath, a big weight gain, or swelling in the arms or legs.  · Not able to control eye movements.  · Swollen gland.  · Fever, chills, or sore throat; any unexplained bruising or bleeding; or feeling very tired or weak.  · Muscle pain or weakness.  · Get medical help right away if you feel very sleepy, very dizzy, or if you pass out. Caregivers or others need to get medical help right away if the patient does not respond, does not answer or react like normal, or will not wake up.  · Like other drugs that may be used for seizures, this drug may rarely raise the risk of suicidal thoughts or actions. The risk may be higher in people who have had suicidal thoughts or actions in the past. Call the doctor right away about any new or worse signs like depression; feeling nervous, restless, or grouchy; panic attacks; or other changes in mood or behavior. Call the doctor right away if any suicidal thoughts or actions occur.  What are some other side effects of this drug?   All drugs may cause side effects. However, many people have no side effects or only have minor side effects. Call your doctor or get medical help if any of these side effects or any other side effects bother you or do not go away:  · Feeling dizzy, sleepy, tired, or weak.  · Diarrhea, upset stomach, or throwing  up.  · Dry mouth.  These are not all of the side effects that may occur. If you have questions about side effects, call your doctor. Call your doctor for medical advice about side effects.  You may report side effects to your national health agency.  You may report side effects to the FDA at 1-946.906.3417. You may also report side effects at https://www.fda.gov/medwatch.  How is this drug best taken?   Use this drug as ordered by your doctor. Read all information given to you. Follow all instructions closely.  All products:   · Keep taking this drug as you have been told by your doctor or other health care provider, even if you feel well.  · If you are taking an antacid that has aluminum or magnesium in it, take this drug at least 2 hours after taking the antacid.  Gralise:   · Take with the evening meal.  · Swallow whole. Do not chew, break, or crush.  All other products:   · Take with or without food.  Capsules:   · Swallow whole with a full glass of water.  Tablets:   · You may break the tablet in half. Do not chew or crush.  · If you break the tablet in half, use the other half of the tablet for the next dose, as told by the doctor. Throw away half-tablets not used within 28 days.  Liquid (solution):   · Measure liquid doses carefully. Use the measuring device that comes with this drug. If there is none, ask the pharmacist for a device to measure this drug.  What do I do if I miss a dose?   · Take a missed dose as soon as you think about it.  · If it is close to the time for your next dose, skip the missed dose and go back to your normal time.  · Do not take 2 doses at the same time or extra doses.    How do I store and/or throw out this drug?   Liquid (solution):   · Store in a refrigerator. Do not freeze.  All other products:   · Store at room temperature in a dry place. Do not store in a bathroom.  All dose forms:   · Keep all drugs in a safe place. Keep all drugs out of the reach of children and  pets.  · Throw away unused or  drugs. Do not flush down a toilet or pour down a drain unless you are told to do so. Check with your pharmacist if you have questions about the best way to throw out drugs. There may be drug take-back programs in your area.  General drug facts   · If your symptoms or health problems do not get better or if they become worse, call your doctor.  · Do not share your drugs with others and do not take anyone else's drugs.  · Some drugs may have another patient information leaflet. If you have any questions about this drug, please talk with your doctor, nurse, pharmacist, or other health care provider.  · Some drugs may have another patient information leaflet. Check with your pharmacist. If you have any questions about this drug, please talk with your doctor, nurse, pharmacist, or other health care provider.  · If you think there has been an overdose, call your poison control center or get medical care right away. Be ready to tell or show what was taken, how much, and when it happened.    Consumer Information Use and Disclaimer   This generalized information is a limited summary of diagnosis, treatment, and/or medication information. It is not meant to be comprehensive and should be used as a tool to help the user understand and/or assess potential diagnostic and treatment options. It does NOT include all information about conditions, treatments, medications, side effects, or risks that may apply to a specific patient. It is not intended to be medical advice or a substitute for the medical advice, diagnosis, or treatment of a health care provider based on the health care provider's examination and assessment of a patient's specific and unique circumstances. Patients must speak with a health care provider for complete information about their health, medical questions, and treatment options, including any risks or benefits regarding use of medications. This information does not endorse  any treatments or medications as safe, effective, or approved for treating a specific patient. Up[employer] Inc[employer] and its affiliates disclaim any warranty or liability relating to this information or the use thereof. The use of this information is governed by the Terms of Use, available at https://www.[employer].FRUCT/en/solutions/lexicomp/about/zoya.  Last Reviewed Date   2020-06-01  Copyright   © 2021 UpToDate, Inc. and its affiliates and/or licensors. All rights reserved.    Patient Education       Celecoxib ([place])   Brand Names: [place] CeleBREX   Brand Names: Emanuel ACCEL-Celecoxib [DSC]; ACT Celecoxib; AG-Celecoxib; APO-Celecoxib; Auro-Celecoxib; BIO-Celecoxib; CeleBREX; GD-Celecoxib [DSC]; JAMP-Celecoxib; M-Celecoxib; Mar-Celecoxib; MINT-Celecoxib; MYLAN-Celecoxib [DSC]; NRA-Celecoxib; PMS-Celecoxib; Priva-Celecoxib; LASHAE-Celecox; SANDOZ Celecoxib [DSC]; SDZ Celecoxib [DSC]; TARO-Celecoxib; TEVA-Celecoxib [DSC]   Warning   · This drug may raise the risk of heart and blood vessel problems like heart attack and stroke. These effects can be deadly. The risk may be greater if you have heart disease or risks for heart disease. However, it can also be raised even if you do not have heart disease or risks for heart disease. The risk can happen within the first weeks of using this drug and may be greater with higher doses or long-term use. Do not use this drug right before or after bypass heart surgery.  · This drug may raise the chance of severe and sometimes deadly stomach or bowel problems like ulcers or bleeding. The risk is greater in older people, and in people who have had stomach or bowel ulcers or bleeding before. These problems may occur without warning signs.    What is this drug used for?   · It is used to ease pain and swelling.  · It is used to treat arthritis.  · It is used to ease painful period (menstrual) cycles.  · It is used to treat ankylosing spondylitis.  · It is used to treat migraine  headaches.  · It may be given to you for other reasons. Talk with the doctor.    What do I need to tell my doctor BEFORE I take this drug?   · If you are allergic to this drug; any part of this drug; or any other drugs, foods, or substances. Tell your doctor about the allergy and what signs you had.  · If you have an allergy to aspirin or nonsteroidal anti-inflammatory drugs (NSAIDs) like ibuprofen or naproxen.  · If you have a sulfa allergy.  · If you have gotten nasal polyps or had swelling of the mouth, face, lips, tongue, or throat; unusual hoarseness; or trouble breathing with aspirin or NSAID use.  · If you have any of these health problems: Dehydration, GI (gastrointestinal) bleeding, heart failure (weak heart), kidney disease, or liver disease.  · If you have had a recent heart attack.  · If you are having trouble getting pregnant or you are having your fertility checked.  · If you are pregnant, plan to become pregnant, or get pregnant while taking this drug. This drug may cause harm to an unborn baby if taken at 20 weeks or later in pregnancy. If you are between 20 to 30 weeks of pregnancy, only take this drug if your doctor has told you to. Do not take this drug if you are more than 30 weeks pregnant.  · If you are taking any other NSAID, a salicylate drug like aspirin, or pemetrexed.  This is not a list of all drugs or health problems that interact with this drug.  Tell your doctor and pharmacist about all of your drugs (prescription or OTC, natural products, vitamins) and health problems. You must check to make sure that it is safe for you to take this drug with all of your drugs and health problems. Do not start, stop, or change the dose of any drug without checking with your doctor.  What are some things I need to know or do while I take this drug?   All products:   · Tell all of your health care providers that you take this drug. This includes your doctors, nurses, pharmacists, and dentists.  · Have  your blood work checked if you are on this drug for a long time. Talk with your doctor.  · High blood pressure has happened with drugs like this one. Have your blood pressure checked as you have been told by your doctor.  · Talk with your doctor before you drink alcohol.  · If you smoke, talk with your doctor.  · If you have asthma, talk with your doctor. You may be more sensitive to this drug.  · Do not take more than what your doctor told you to take. Taking more than you are told may raise your chance of very bad side effects.  · Do not take this drug for longer than you were told by your doctor.  · You may bleed more easily. Be careful and avoid injury. Use a soft toothbrush and an electric razor.  · The chance of heart failure is raised with the use of drugs like this one. In people who already have heart failure, the chance of heart attack, having to go to the hospital for heart failure, and death is raised. Talk with the doctor.  · The chance of heart attack and heart-related death is raised in people taking drugs like this one after a recent heart attack. People taking drugs like this one after a first heart attack were also more likely to die in the year after the heart attack compared with people not taking drugs like this one. Talk with the doctor.  · If you are taking aspirin to help prevent a heart attack, talk with your doctor.  · Liver problems have happened with drugs like this one. Sometimes, this has been deadly. Call your doctor right away if you have signs of liver problems like dark urine, feeling tired, not hungry, upset stomach or stomach pain, light-colored stools, throwing up, or yellow skin or eyes.  · A severe and sometimes deadly reaction has happened with drugs like this one. Most of the time, this reaction has signs like fever, rash, or swollen glands with problems in body organs like the liver, kidney, blood, heart, muscles and joints, or lungs. If you have questions, talk with the  doctor.  · If you are 65 or older, use this drug with care. You could have more side effects.  · If the patient is a child, use this drug with care. Your child could have more side effects.  · NSAIDs like this drug may affect egg release (ovulation). This may affect being able to get pregnant. This goes back to normal when this drug is stopped. Talk with the doctor.  · Tell your doctor if you are breast-feeding. You will need to talk about any risks to your baby.  Oral solution:   · This drug is not meant to prevent or lower the number of migraine headaches you get.  · Taking more of this drug (a higher dose, more often) than your doctor told you to take may cause your headaches to become worse.  What are some side effects that I need to call my doctor about right away?   WARNING/CAUTION: Even though it may be rare, some people may have very bad and sometimes deadly side effects when taking a drug. Tell your doctor or get medical help right away if you have any of the following signs or symptoms that may be related to a very bad side effect:  · Signs of an allergic reaction, like rash; hives; itching; red, swollen, blistered, or peeling skin with or without fever; wheezing; tightness in the chest or throat; trouble breathing, swallowing, or talking; unusual hoarseness; or swelling of the mouth, face, lips, tongue, or throat.  · Signs of bleeding like throwing up or coughing up blood; vomit that looks like coffee grounds; blood in the urine; black, red, or tarry stools; bleeding from the gums; abnormal vaginal bleeding; bruises without a cause or that get bigger; or bleeding you cannot stop.  · Signs of kidney problems like unable to pass urine, change in how much urine is passed, blood in the urine, or a big weight gain.  · Signs of high potassium levels like a heartbeat that does not feel normal; feeling confused; feeling weak, lightheaded, or dizzy; feeling like passing out; numbness or tingling; or shortness of  breath.  · Signs of high blood pressure like very bad headache or dizziness, passing out, or change in eyesight.  · Shortness of breath, a big weight gain, or swelling in the arms or legs.  · Chest pain or pressure.  · Weakness on 1 side of the body, trouble speaking or thinking, change in balance, drooping on one side of the face, or blurred eyesight.  · Feeling very tired or weak.  · Flu-like signs.  · Swollen gland.  · A severe skin reaction (Puri-Adrian syndrome/toxic epidermal necrolysis) may happen. It can cause severe health problems that may not go away, and sometimes death. Get medical help right away if you have signs like red, swollen, blistered, or peeling skin (with or without fever); red or irritated eyes; or sores in your mouth, throat, nose, or eyes.  What are some other side effects of this drug?   All drugs may cause side effects. However, many people have no side effects or only have minor side effects. Call your doctor or get medical help if any of these side effects or any other side effects bother you or do not go away:  Capsules:   · Constipation, diarrhea, stomach pain, upset stomach, or throwing up.  · Heartburn.  · Gas.  · Dizziness or headache.  · Signs of a common cold.  · Nose or throat irritation.  Oral solution:   · Change in taste.  These are not all of the side effects that may occur. If you have questions about side effects, call your doctor. Call your doctor for medical advice about side effects.  You may report side effects to your national health agency.  You may report side effects to the FDA at 1-465.998.5123. You may also report side effects at https://www.fda.gov/medwatch.  How is this drug best taken?   Use this drug as ordered by your doctor. Read all information given to you. Follow all instructions closely.  Capsules:   · Take with or without food. Take with food if it causes an upset stomach.  · Take with a full glass of water.  · You may sprinkle contents of capsule  on applesauce. Do not chew.  · If mixing on applesauce, the applesauce should not be warm. Do not sprinkle on other liquids or foods.  · Use right away after mixing or you may store in a refrigerator for up to 6 hours.  Oral solution:   · Take with or without food.  · If you need to measure a liquid dose, ask the pharmacist for a device to measure this drug.  · Do not use a household teaspoon or tablespoon to measure this drug. Doing so could lead to the dose being too high.  · Each container is for one use only. Use right after opening. Throw away any part of the opened container after the dose is given.  · You may drink up to 8 ounces (240 mL) of water after taking this drug.  What do I do if I miss a dose?   Capsules:   · Take a missed dose as soon as you think about it.  · If it is close to the time for your next dose, skip the missed dose and go back to your normal time.  · Do not take 2 doses at the same time or extra doses.  Oral solution:   · This drug is taken on an as needed basis. Do not take more often than told by the doctor.  · Do not take more than 1 dose of this drug in 24 hours.  How do I store and/or throw out this drug?   Capsules:   · Store at room temperature in a dry place. Do not store in a bathroom.  Oral solution:   · Store at room temperature. Do not refrigerate or freeze.  · Store in a dry place. Do not store in a bathroom.  All products:   · Keep all drugs in a safe place. Keep all drugs out of the reach of children and pets.  · Throw away unused or  drugs. Do not flush down a toilet or pour down a drain unless you are told to do so. Check with your pharmacist if you have questions about the best way to throw out drugs. There may be drug take-back programs in your area.  General drug facts   · If your symptoms or health problems do not get better or if they become worse, call your doctor.  · Do not share your drugs with others and do not take anyone else's drugs.  · Some drugs may  have another patient information leaflet. If you have any questions about this drug, please talk with your doctor, nurse, pharmacist, or other health care provider.  · This drug comes with an extra patient fact sheet called a Medication Guide. Read it with care. Read it again each time this drug is refilled. If you have any questions about this drug, please talk with the doctor, pharmacist, or other health care provider.  · If you think there has been an overdose, call your poison control center or get medical care right away. Be ready to tell or show what was taken, how much, and when it happened.    Consumer Information Use and Disclaimer   This generalized information is a limited summary of diagnosis, treatment, and/or medication information. It is not meant to be comprehensive and should be used as a tool to help the user understand and/or assess potential diagnostic and treatment options. It does NOT include all information about conditions, treatments, medications, side effects, or risks that may apply to a specific patient. It is not intended to be medical advice or a substitute for the medical advice, diagnosis, or treatment of a health care provider based on the health care provider's examination and assessment of a patient's specific and unique circumstances. Patients must speak with a health care provider for complete information about their health, medical questions, and treatment options, including any risks or benefits regarding use of medications. This information does not endorse any treatments or medications as safe, effective, or approved for treating a specific patient. UpToDate, Inc. and its affiliates disclaim any warranty or liability relating to this information or the use thereof. The use of this information is governed by the Terms of Use, available at https://www.[employer].com/en/solutions/lexicomp/about/zoay.  Last Reviewed Date   2020-12-07  Copyright   © 2021 UpToDate, Inc. and its  affiliates and/or licensors. All rights reserved.  Patient Education       Aspirin (AS pir in)   Brand Names: US Ascriptin Maximum Strength [OTC]; Ascriptin Regular Strength [OTC]; Aspercin [OTC]; Aspir-low [OTC]; Aspirin Adult Low Dose [OTC]; Aspirin Adult Low Strength [OTC]; Aspirin EC Low Strength [OTC]; Aspirtab [OTC]; Marlen Aspirin EC Low Dose [OTC]; Marlen Aspirin Extra Strength [OTC]; Marlen Aspirin Regimen Adult Low Strength [OTC]; Marlen Aspirin Regimen Children's [OTC]; Marlen Aspirin Regimen Regular Strength [OTC]; Marlen Aspirin [OTC]; Marlen Genuine Aspirin [OTC]; Marlen Plus Extra Strength [OTC]; Marlen Women's Low Dose Aspirin [OTC]; Buffasal [OTC]; Bufferin Extra Strength [OTC]; Bufferin [OTC]; Buffinol [OTC]; Durlaza; Ecotrin [OTC]; GoodSense Low Dose [OTC]; Halfprin [OTC] [DSC]; [facility] Adult Aspirin [OTC]; Tri-Buffered Aspirin [OTC]; Vazalore [OTC]   Brand Names: Emaunel Entrophen; Praxis ASA EC; Rivasa; Rivasa FC   What is this drug used for?   · It is used to treat rheumatic fever.  · It is used to ease pain and fever.  · It is used to treat some types of arthritis.  · It is used to protect bypass grafts and stents in the heart.  · It is used to lower the chance of heart attack, stroke, and death in some people.  · It may be given to you for other reasons. Talk with the doctor.    What do I need to tell my doctor BEFORE I take this drug?   For all patients taking this drug:   · If you are allergic to this drug; any part of this drug; or any other drugs, foods, or substances. Tell your doctor about the allergy and what signs you had.  · If you have any of these health problems: Asthma, bleeding problems, nose polyps, or nose irritation.  · If you have any of these health problems: Kidney disease or liver disease.  · If you have any of these health problems: GI (gastrointestinal) bleeding or ulcer disease.  · If you are taking another drug that has the same drug in it.  · If you are taking any other  NSAID.  · If you are pregnant, plan to become pregnant, or get pregnant while taking this drug. This drug may cause harm to an unborn baby if taken at 20 weeks or later in pregnancy. If you are at 20 weeks or later in pregnancy, only take this drug if your doctor has told you to.  · If you are breast-feeding or plan to breast-feed.  Children:   · If the patient is a child. This drug may not be for use in all ages of children.  · If your child or teenager has or is getting better from flu signs, chickenpox, or other viral infections. The risk of a very bad problem called Reye's syndrome may be raised. Do not give this drug to a child or teenager who has or is getting better from a viral infection.  This is not a list of all drugs or health problems that interact with this drug.  Tell your doctor and pharmacist about all of your drugs (prescription or OTC, natural products, vitamins) and health problems. You must check to make sure that it is safe for you to take this drug with all of your drugs and health problems. Do not start, stop, or change the dose of any drug without checking with your doctor.  What are some things I need to know or do while I take this drug?   All products:   · Tell all of your health care providers that you take this drug. This includes your doctors, nurses, pharmacists, and dentists.  · Have your blood work checked if you are on this drug for a long time. Talk with your doctor.  · Do not take more than what your doctor told you to take. Taking more than you are told may raise your chance of very bad side effects.  · Do not take this drug for longer than you were told by your doctor.  · If you take this drug on a regular basis, do not stop taking it without calling the doctor who ordered it for you.  · You may bleed more easily. Be careful and avoid injury. Use a soft toothbrush and an electric razor.  · Talk with your doctor before you drink alcohol.  · If you smoke, talk with your  doctor.  · This drug may raise the chance of severe and sometimes deadly stomach or bowel problems like ulcers or bleeding. The risk is greater in older people, and in people who have had stomach or bowel ulcers or bleeding before. These problems may occur without warning signs.  · If you are over the age of 60, use this drug with care. You could have more side effects.  Extended-release capsules:   · This drug is not for use if you have chest pain and need to take aspirin quickly. Take a fast-acting form of aspirin. If you are not sure which form of aspirin is fast-acting, talk with the doctor.  · Do not take this drug within 2 hours before or 1 hour after drinking alcohol.  What are some side effects that I need to call my doctor about right away?   WARNING/CAUTION: Even though it may be rare, some people may have very bad and sometimes deadly side effects when taking a drug. Tell your doctor or get medical help right away if you have any of the following signs or symptoms that may be related to a very bad side effect:  All products:   · Signs of an allergic reaction, like rash; hives; itching; red, swollen, blistered, or peeling skin with or without fever; wheezing; tightness in the chest or throat; trouble breathing, swallowing, or talking; unusual hoarseness; or swelling of the mouth, face, lips, tongue, or throat.  · Signs of bleeding like throwing up or coughing up blood; vomit that looks like coffee grounds; blood in the urine; black, red, or tarry stools; bleeding from the gums; abnormal vaginal bleeding; bruises without a cause or that get bigger; or bleeding you cannot stop.  · Signs of kidney problems like unable to pass urine, change in how much urine is passed, blood in the urine, or a big weight gain.  · Signs of liver problems like dark urine, feeling tired, not hungry, upset stomach or stomach pain, light-colored stools, throwing up, or yellow skin or eyes.  · Signs of high potassium levels like a  heartbeat that does not feel normal; feeling confused; feeling weak, lightheaded, or dizzy; feeling like passing out; numbness or tingling; or shortness of breath.  · Signs of too much acid in the blood (acidosis) like confusion; fast breathing; fast heartbeat; a heartbeat that does not feel normal; very bad stomach pain, upset stomach, or throwing up; feeling very sleepy; shortness of breath; or feeling very tired or weak.  · Weakness on 1 side of the body, trouble speaking or thinking, change in balance, drooping on one side of the face, or blurred eyesight.  · Very bad dizziness or passing out.  · Very bad headache.  · Ringing in the ears, hearing loss, or any other changes in hearing.  · Feeling agitated.  · Seizures.  Suppository:   · Bleeding from rectum or rectal pain.  What are some other side effects of this drug?   All drugs may cause side effects. However, many people have no side effects or only have minor side effects. Call your doctor or get medical help if any of these side effects or any other side effects bother you or do not go away:  All oral products:   · Stomach pain or heartburn.  · Upset stomach or throwing up.  Suppository:   · Rectal irritation.  These are not all of the side effects that may occur. If you have questions about side effects, call your doctor. Call your doctor for medical advice about side effects.  You may report side effects to your national health agency.  You may report side effects to the FDA at 1-294.195.5131. You may also report side effects at https://www.fda.gov/medwatch.  How is this drug best taken?   Use this drug as ordered by your doctor. Read all information given to you. Follow all instructions closely.  All oral products:   · Take with or without food. Take with food if it causes an upset stomach.  · Take with a full glass of water.  Enteric-coated and buffered tablets:   · Swallow whole. Do not chew, break, or crush.  Extended-release capsules:   · Swallow  whole. Do not chew, open, or crush.  · Take this drug at the same time of day.  Chewable tablets:   · This drug may be chewed or swallowed whole.  Suppository:   · Use suppository rectally.  · If suppository is soft, chill in a refrigerator or run cold water over it.  · To use, take off  and wet suppository with cold water. Use your finger to push the suppository well up into the rectum.  What do I do if I miss a dose?   · Take a missed dose as soon as you think about it.  · If it is close to the time for your next dose, skip the missed dose and go back to your normal time.  · Do not take 2 doses at the same time or extra doses.  · Some products are to be taken as needed. If you are taking this drug as needed, do not take more often than told by your doctor.    How do I store and/or throw out this drug?   All oral products:   · Store at room temperature in a dry place. Do not store in a bathroom.  · Protect from heat.  Suppository:   · Store in a refrigerator. Do not freeze.  · Do not use suppositories if they have a vinegar smell.  All products:   · Keep all drugs in a safe place. Keep all drugs out of the reach of children and pets.  · Throw away unused or  drugs. Do not flush down a toilet or pour down a drain unless you are told to do so. Check with your pharmacist if you have questions about the best way to throw out drugs. There may be drug take-back programs in your area.  General drug facts   · If your symptoms or health problems do not get better or if they become worse, call your doctor.  · Do not share your drugs with others and do not take anyone else's drugs.  · Some drugs may have another patient information leaflet. If you have any questions about this drug, please talk with your doctor, nurse, pharmacist, or other health care provider.  · Some drugs may have another patient information leaflet. Check with your pharmacist. If you have any questions about this drug, please talk with  your doctor, nurse, pharmacist, or other health care provider.  · If you think there has been an overdose, call your poison control center or get medical care right away. Be ready to tell or show what was taken, how much, and when it happened.    Consumer Information Use and Disclaimer   This generalized information is a limited summary of diagnosis, treatment, and/or medication information. It is not meant to be comprehensive and should be used as a tool to help the user understand and/or assess potential diagnostic and treatment options. It does NOT include all information about conditions, treatments, medications, side effects, or risks that may apply to a specific patient. It is not intended to be medical advice or a substitute for the medical advice, diagnosis, or treatment of a health care provider based on the health care provider's examination and assessment of a patient's specific and unique circumstances. Patients must speak with a health care provider for complete information about their health, medical questions, and treatment options, including any risks or benefits regarding use of medications. This information does not endorse any treatments or medications as safe, effective, or approved for treating a specific patient. Up[employer] Inc[employer] and its affiliates disclaim any warranty or liability relating to this information or the use thereof. The use of this information is governed by the Terms of Use, available at https://www.[employer]/en/solutions/lexicomp/about/zoya.  Last Reviewed Date   2020-10-21  Copyright   © 2021 UpTo[employer], Inc. and its affiliates and/or licensors. All rights reserved.  Patient Education       Acetaminophen (a seet a MIN oh fen)   Brand Names: US 7T Gummy ES [DSC]; 8 Hour Pain Reliever [OTC]; Acephen [OTC] [DSC]; Acetaminophen 8 Hour [OTC]; Acetaminophen Extra Strength [OTC]; Aminofen [OTC] [DSC]; Apra [OTC]; Arthritis Pain Relief [OTC]; Aurophen Childrens [OTC]; BetaTemp  Childrens [OTC]; Childrens Acetaminophen [OTC]; Childrens APAP [OTC]; Childrens Non-ASA Pain Relief [OTC] [DSC]; Childrens Non-Aspirin [OTC]; Childrens Silapap [OTC]; Childrens Tactinal [OTC] [DSC]; Ed-APAP [OTC]; ElixSure Fever/Pain [OTC]; Febrol [OTC] [DSC]; FeverAll Adults [OTC]; FeverAll Childrens [OTC]; FeverAll Infants [OTC]; FeverAll Chris Strength [OTC]; GoodSense Pain & Fever Child [OTC]; GoodSense Pain & Fever Infants [OTC]; GoodSense Pain Relief Extra St [OTC]; GoodSense Pain Relief [OTC] [DSC]; Healthy Mama Shake That Ache [OTC]; Liquid Pain Relief [OTC]; M-PAP [OTC]; Mapap Acetaminophen Extra Str [OTC]; Mapap Arthritis Pain [OTC]; Mapap Childrens [OTC]; Mapap Extra Strength [OTC] [DSC]; Mapap [OTC]; Maxapap Maximum Strength [OTC] [DSC]; Maxapap Regular Strength [OTC] [DSC]; Non-Aspirin Extra Strength [OTC]; Non-Aspirin Pain Reliever [OTC] [DSC]; Non-Aspirin [OTC]; Nortemp Infants [OTC] [DSC]; Nortemp [OTC] [DSC]; Ofirmev; Pain & Fever Childrens [OTC]; Pain & Fever Extra Strength [OTC] [DSC]; Pain & Fever Infants [OTC] [DSC]; Pain & Fever [OTC] [DSC]; Pain Relief Childrens [OTC]; Pain Relief Extra Strength [OTC]; Pain Relief Regular Strength [OTC]; Pain Relief [OTC]; Panadol Childrens [OTC]; Panadol Extra Strength [OTC]; Panadol Infants [OTC]; Pharbetol Extra Strength [OTC]; Pharbetol [OTC]; Tactinal Extra Strength [OTC] [DSC]; Tactinal [OTC] [DSC]; Triaminic Fever Reducer [OTC]; Tylenol 8 Hour Arthritis Pain [OTC]; Tylenol 8 Hour [OTC]; Tylenol Childrens Chewables [OTC]; Tylenol Childrens Pain + Fever [OTC]; Tylenol Childrens [OTC]; Tylenol Dissolve Packs [OTC]; Tylenol Extra Strength [OTC]; Tylenol for Children + Adults [OTC]; Tylenol Infants Pain+Fever [OTC]; Tylenol Infants [OTC]; Tylenol [OTC]   Warning   · Liver problems have happened with the use of acetaminophen. Sometimes, this has led to a liver transplant or death. Most of the time, liver problems happened in people taking more than 4,000 mg  (milligrams) of acetaminophen in a day. People were also often taking more than 1 drug that had acetaminophen in it. If you have questions, talk with your doctor.    What is this drug used for?   · It is used to ease pain and fever.    What do I need to tell my doctor BEFORE I take this drug?   · If you are allergic to this drug; any part of this drug; or any other drugs, foods, or substances. Tell your doctor about the allergy and what signs you had.  · If you have liver disease.  This is not a list of all drugs or health problems that interact with this drug.  Tell your doctor and pharmacist about all of your drugs (prescription or OTC, natural products, vitamins) and health problems. You must check to make sure that it is safe for you to take this drug with all of your drugs and health problems. Do not start, stop, or change the dose of any drug without checking with your doctor.  What are some things I need to know or do while I take this drug?   · Tell all of your health care providers that you take this drug. This includes your doctors, nurses, pharmacists, and dentists.  · Avoid taking other products that have acetaminophen in them. Check labels closely. Too much acetaminophen may cause liver problems.  · Follow the directions exactly. Do not take more acetaminophen in a day than directed. If you do not know how much acetaminophen you can take in a day, ask your doctor or pharmacist. Some people may take up to 4,000 mg (milligrams) in a day if told to do so by the doctor. Some people (like people with liver problems and children) should take less acetaminophen. Call your doctor right away if you have taken too much acetaminophen in a day, even if you feel well.  · Talk with your doctor before you drink alcohol.  · This drug may affect certain lab tests. Tell all of your health care providers and lab workers that you take this drug.  · If you have phenylketonuria (PKU), talk with your doctor. Some products  have phenylalanine.  · Allergic reactions have happened with this drug. Rarely, some reactions can be very bad or life-threatening. Talk with the doctor.  · Different brands of this drug may have different doses for children. Talk with the doctor before giving this drug to a child.  · Tell your doctor if you are pregnant, plan on getting pregnant, or are breast-feeding. You will need to talk about the benefits and risks to you and the baby.    What are some side effects that I need to call my doctor about right away?   WARNING/CAUTION: Even though it may be rare, some people may have very bad and sometimes deadly side effects when taking a drug. Tell your doctor or get medical help right away if you have any of the following signs or symptoms that may be related to a very bad side effect:  · Signs of an allergic reaction, like rash; hives; itching; red, swollen, blistered, or peeling skin with or without fever; wheezing; tightness in the chest or throat; trouble breathing, swallowing, or talking; unusual hoarseness; or swelling of the mouth, face, lips, tongue, or throat.  · Signs of liver problems like dark urine, feeling tired, not hungry, upset stomach or stomach pain, light-colored stools, throwing up, or yellow skin or eyes.  · Not able to pass urine or change in how much urine is passed.  · A severe skin reaction (Puri-Adrian syndrome/toxic epidermal necrolysis) may happen. It can cause severe health problems that may not go away, and sometimes death. Get medical help right away if you have signs like red, swollen, blistered, or peeling skin (with or without fever); red or irritated eyes; or sores in your mouth, throat, nose, or eyes.  What are some other side effects of this drug?   All drugs may cause side effects. However, many people have no side effects or only have minor side effects. Call your doctor or get medical help if any of these side effects or any other side effects bother you or do not go  away:  · Upset stomach or throwing up.  · Trouble sleeping.  · Headache.  · Constipation.  These are not all of the side effects that may occur. If you have questions about side effects, call your doctor. Call your doctor for medical advice about side effects.  You may report side effects to your national health agency.  You may report side effects to the FDA at 1-662.113.9118. You may also report side effects at https://www.fda.gov/medwatch.  How is this drug best taken?   Use this drug as ordered by your doctor. Read all information given to you. Follow all instructions closely.  All oral products:   · Take with or without food.  Chewable tablets:   · Chew well before swallowing.  Oral-disintegrating tablet:   · Place on the tongue and let dissolve.  Extended-release tablets:   · Swallow whole. Do not chew, break, or crush.  · Take with a full glass of water.  Liquid (drops):   · Measure liquid doses carefully. Use the measuring device that comes with this drug.  All other liquid products:   · Measure liquid doses carefully. Use the measuring device that comes with this drug. If there is none, ask the pharmacist for a device to measure this drug.  Liquid (suspension):   · Shake well before use.  Suppository:   · Use suppository rectally.  · Wash your hands before and after use.  · If suppository is soft, chill in a refrigerator or run cold water over it.  · To use suppository, take off .  · Wet suppository before putting in rectum.  · Put suppository into the rectum with gentle pressure, pointed end first. Do not handle too much.  Injection:   · It is given into a vein for a period of time.  What do I do if I miss a dose?   Oral products and suppository:   · If you take this drug on a regular basis, take a missed dose as soon as you think about it.  · If it is close to the time for your next dose, skip the missed dose and go back to your normal time.  · Do not take 2 doses at the same time or extra  doses.  · Many times this drug is taken on an as needed basis. Do not take more often than told by the doctor.  Injection:   · Call your doctor to find out what to do.  How do I store and/or throw out this drug?   All oral products:   · Store at room temperature. Do not refrigerate or freeze.  · Store in original container.  · Keep lid tightly closed.  Suppository:   · Store at room temperature. Do not freeze.  · Some brands may be stored in the refrigerator. Ask your pharmacist or check the package label.  Oral products and suppository:   · Protect from light.  · Store in a dry place. Do not store in a bathroom.  Injection:   · If you need to store this drug at home, talk with your doctor, nurse, or pharmacist about how to store it.  All products:   · Keep all drugs in a safe place. Keep all drugs out of the reach of children and pets.  · Throw away unused or  drugs. Do not flush down a toilet or pour down a drain unless you are told to do so. Check with your pharmacist if you have questions about the best way to throw out drugs. There may be drug take-back programs in your area.  General drug facts   · If your symptoms or health problems do not get better or if they become worse, call your doctor.  · Do not share your drugs with others and do not take anyone else's drugs.  · Some drugs may have another patient information leaflet. If you have any questions about this drug, please talk with your doctor, nurse, pharmacist, or other health care provider.  · Some drugs may have another patient information leaflet. Check with your pharmacist. If you have any questions about this drug, please talk with your doctor, nurse, pharmacist, or other health care provider.  · If you think there has been an overdose, call your poison control center or get medical care right away. Be ready to tell or show what was taken, how much, and when it happened.    Consumer Information Use and Disclaimer   This generalized  information is a limited summary of diagnosis, treatment, and/or medication information. It is not meant to be comprehensive and should be used as a tool to help the user understand and/or assess potential diagnostic and treatment options. It does NOT include all information about conditions, treatments, medications, side effects, or risks that may apply to a specific patient. It is not intended to be medical advice or a substitute for the medical advice, diagnosis, or treatment of a health care provider based on the health care provider's examination and assessment of a patient's specific and unique circumstances. Patients must speak with a health care provider for complete information about their health, medical questions, and treatment options, including any risks or benefits regarding use of medications. This information does not endorse any treatments or medications as safe, effective, or approved for treating a specific patient. UpToDate, Inc. and its affiliates disclaim any warranty or liability relating to this information or the use thereof. The use of this information is governed by the Terms of Use, available at https://www.[employer].com/en/solutions/lexicomp/about/zoya.  Last Reviewed Date   2020-03-24  Copyright   © 2021 UpToDate, Inc. and its affiliates and/or licensors. All rights reserved.

## 2021-12-28 NOTE — PLAN OF CARE
Patient has met PACU discharge criteria, VSS, pain well controlled. Family updated by phone. Released from PACU by Dr. Rodriguez.

## 2021-12-28 NOTE — INTERVAL H&P NOTE
The patient has been examined and the H&P has been reviewed:    I concur with the findings and no changes have occurred since H&P was written.    Surgery risks, benefits and alternative options discussed and understood by patient/family.      Active Hospital Problems    Diagnosis  POA    *Left anterior cruciate ligament tear [S83.512A]  Yes    Complex tear of lateral meniscus of left knee as current injury [S83.272A]  Yes      Resolved Hospital Problems   No resolved problems to display.

## 2021-12-28 NOTE — BRIEF OP NOTE
Brief op/discharge Note    Ava Parikh  [id number]    Date of Procedure: 12/28/2021    Pre-op Diagnosis:  Left ACL tear, morbid obesity, patellar chondromalacia left       Post-op Diagnosis: Same    Procedure(s):  Left ACL reconstruction  Patella chondroplasty    Anesthesia: General    Surgeon(s) and Role:  Panel 1:     * Alex De Guzman MD - Primary     * Tianna Lucas MD - Resident: Surgeon Not Chief    Estimated Blood Loss: Minimal    Quantatative Blood Loss: No Data Recorded           Drain:  None            Total IV Fluids:  See anesthesia note           Specimens: * No specimens in log *           Implants:  See op note           Complications:  See op note    Findings:  See op note           Disposition: awakened from anesthesia, extubated and taken to the recovery room in a stable condition, having suffered no apparent untoward event.           Condition: doing well without problems      Technique: See operative report     Admission Condition: good    Discharged Condition: good    Indication for Admission:  None at this time    Hospital Course:  Outpatient surgery    Patient Instructions:   There are no discharge medications for this patient.    Activity: activity as tolerated, weight-bearing as tolerated in hinged knee brace  Diet: regular diet  Wound Care: keep wound clean and dry    Discussed plan with patient and answered questions:  Yes      - - -  Tianna Lucas MD  Pager: [phone]

## 2021-12-29 ENCOUNTER — TELEPHONE (OUTPATIENT)
Dept: PHARMACY | Facility: CLINIC | Age: 31
End: 2021-12-29
Payer: MEDICAID

## 2021-12-29 NOTE — PLAN OF CARE
All instructions given, vss, pain at tolerable level, significant other at bedside for instructions and involved in care, safely in car

## 2021-12-31 ENCOUNTER — PATIENT MESSAGE (OUTPATIENT)
Dept: ORTHOPEDICS | Facility: CLINIC | Age: 31
End: 2021-12-31
Payer: MEDICAID

## 2021-12-31 DIAGNOSIS — S83.512D RUPTURE OF ANTERIOR CRUCIATE LIGAMENT OF LEFT KNEE, SUBSEQUENT ENCOUNTER: Primary | ICD-10-CM

## 2022-01-03 RX ORDER — CELECOXIB 200 MG/1
200 CAPSULE ORAL 2 TIMES DAILY
Qty: 14 CAPSULE | Refills: 0 | Status: SHIPPED | OUTPATIENT
Start: 2022-01-03 | End: 2022-01-10

## 2022-01-04 NOTE — OP NOTE
Operative Report    MICHAELLE PARIKH  : 1990  MRN: [id number]    Date of Procedure: 2022     Pre-op Diagnosis:    Left ACL Rupture  Morbid Obesity (BMI 45)    Post-op Diagnosis:       * Rupture of anterior cruciate ligament of left knee     * Chondromalacia, patella, left      * Morbid obesity with BMI of 45.0-49.9     Procedure:   Left ACL Reconstruction with Quadriceps Tendon Autograft (36750)  Left Patellar Chondroplasty (13382)  Modifier 22- Complex Procedure    Surgeon: Alex De Guzman IV, MD     Assisting Surgeon:    Tianna Lucas MD     Anesthesiologist:  see record    Anesthesia:    general and regional    Estimated Blood Loss: minimal         Specimens: none    Findings:   Effusion: Present   Patella: Grade III chondromalacia   Trochlea: Grade II chondromalacia   Medial cartilage: Normal   Medial meniscus: Normal   Lateral cartilage: Grade II chondromalacia   Lateral meniscus: Normal   ACL: Torn   PCL: Normal   Other: Fibrosis and lateral retinacular plica    Field of view:  6      Indications for surgery:   Michaelle Parikh is a 31 y.o. female patient who sustained a traumatic injury to the knee.  The history, examination, and imaging were consistent with instability related to an ACL tear.   Initial concern was also given to a meniscus tear, but there was no meniscus tear during surgery. The risks and benefits of surgery including pain, bleeding, infection, surgery failure, need for revision surgery, rerupture of the graft, and failure to return to prior level of function.  Graft choices were discussed, including quad tendon autograft versus other autografts or allograft.  After this discussion the patient selected quad tendon autograft and elected to proceed with surgery.     Description of procedure:   Prior to the surgical procedure, the patient was identified in the preoperative holding area.  We had a thorough discussion about the risks and benefits of surgery including the expected post  operative protocol.  The patient signed an informed consent and the operative extremity was marked.  A regional anesthesia block was performed by the Anesthesiology team after surgery.  Preoperative antibiotics were given prior to incision for infection prophylaxis. The patient was taken to the operating room and positioned on the table in the ACL position.  After general anesthesia was induced, a well padded thigh tourniquet was placed and the patient was further positioned for surgery.  Given her large body habitus, positioning was extremely difficult and required extra assistance and time to safely position the patient securely on the table.  An exam was performed, which showed a positive Lachman and a positive pivot shift. The patient was subsequently prepped and draped in the usual orthopaedic sterile fashion.  A surgical timeout was performed confirming the surgical site and procedure prior to proceeding.  Local anesthetic was injected into the proposed portals.      An approximately 3-cm horizontal incision was made approximately 1cm proximal to the proximal pole of the patella to access the quadriceps tendon.  The fat pad over the quad tendon was excised and the tendon was clearly visualized.  This resection was more difficult than usual due to large volume of fatty tissue overlying the quadriceps tendon.  A retractor and elevator were used to lift the skin and subcutaneous tissue from the quadriceps tendon and the arthroscope was introduced into the wound.  The length of the tendon was measured and determined to be appropriate for use as an autograft.  Therefore, the graft harvest was performed by first freeing approximately 9mm of the tendon width and 2cm of length from the proximal aspect of the patella.  The end of the tendon was whipstitched and the Arthrex QuadPro graft harvester was used to obtain approximately 65mm of quadriceps tendon while taking care to avoid the VMO during the tendon harvest.   After harvest, a Scorpion suture passer was used to close the defect in the tendon.  The wound was then irrigated and closed using 2.0 vicryl suture and 3.0 nylon suture.    Attention was then turned toward preparing the graft.  The Arthrex Tightrope button construct was attached to the femoral aspect of the graft.  The Arthrex ABS button construct was attached to the tibial aspect.  The graft was then placed in a compression tube under 20N of tensile force while the remainder of the case was performed.      A standard anterolateral portal was then made, and the arthroscope was introduced.  Portal placement was extremely difficult due to body habitus and difficulty palpating the normal anatomic landmarks for proper portal placement.  This resulted in larger than usual portal length to obtain optimal arthroscopic visualization angle.  Under direct vision with the use of a spinal needle an anteromedial portal was made, and a probe was placed in the knee.  A thorough diagnostic arthroscopy was performed, and the findings noted above were seen.      (31434) Attention was first turned to the torn stump of the ACL.  A motorized shaver was used to debride the stump. A erika was used to perform a limited notchplasty to improve visualization of the posterior wall for the femoral socket.  The appropriate sized femoral socket was then drilled at the anatomic location of the ACL using a Flipcutter.  Direct visualization confirmed the femoral socket had an appropriate backwall thickness and adequate cortical bone at the distal aspect of the socket.  A tunnel was then drilled to the appropriate size using the Flipcutter at the anatomic footprint in the tibia, taking care to avoid injuring the transverse meniscal ligament.  Shuttling sutures were passed through the femoral and tibial sockets and brought out through the medial portal.    The graft was then shuttled into the knee through the medial portal.  The femoral cortical  button was advanced through the tunnel and flipped, seating it completely against the lateral femoral cortex.  The tensioning sutures were then used to advance the femoral portion of the graft into the femoral socket to the appropriate depth.  The sutures attached to the tibial end of the graft were then shuttled into the tibial tunnel and the graft advanced into the tibial socket.  The ABS button was applied to the tibial side tightrope mechanism and tensioning sutures were used to tension the graft into position with the knee in full extension.  An intraoperative radiograph demonstrated the femoral and tibial cortical button was fully seated.  The resulting Lachman was stable.  The scope was placed back in the knee, and the graft showed appropriate tension throughout range of motion with no impingement. The tibial side of the of the graft was further secured using an Arthrex SwiveLock anchor for routine backup fixation.      (69600) Attention was turned to the patellar chondromalacia.  A motorized shaver was used to resect any unstable cartilage flaps along the lateral facet and apex of the patellar chondral surface.  The trochlea did not require chondroplasty.  The probe demonstrated that the remaining cartilage tissue was stable.      A thorough lavage was performed and any loose debris was removed.  The arthroscopic fluid and instruments were removed. The skin was closed with 2.0 vicryl and 3.0 nylon; portals were closed with 3.0 nylon.  A mixture of Exparel and bupivacaine was used to inject around the surgical site to augment a multimodal, narcotic minimizing post operative pain control regimen.  A dry, sterile dressing was placed and the tourniquet was deflated. A cryo-cuff and hinged knee brace were then applied. The sponge needle and instrument counts were correct at the end of the case. The patient tolerated the procedure well, was extubated, and was taken to recovery in stable  condition.    Post-Operative Management:  The patient will be NWB on the operative extremity.  After discharge, the patient will follow up in my office ([phone]) in 14 days after surgery. The patient will start physical therapy within one week of surgery.  The patient will be treated with DVT chemoprophylaxis in the post operative period.    COMPLEX PROCEDURE:  There was an altered surgical field and abnormal anatomy due to the patient's morbid obesity (BMI 45.63).  This condition increased the difficulty of the case including extra time and personnel required for patient positioning.  There was also noted to be increased fatty tissue overlying the surgical site, making incision placement and surgical dissection more difficult and requiring more surgical time.  Complexity of the service was much greater than the normative procedure. There was increased time, technical difficulty of the procedure, and mental effort required.  This was a highly complicated case made more difficult by the patients body habitus requiring high level of skill in order to safely and technically perform it.      Complications: No     Condition: Good     Disposition: PACU - hemodynamically stable.     Attestation: I was present and scrubbed for the entire procedure.    Implants:   Implant Name Type Inv. Item Serial No.  Lot No. LRB No. Used Action   TIGHTROPE ACL RT - [id number]  TIGHTROPE ACL RT  [employer] [id number] Left 1 Implanted   ACL Fiber Tag TightRope ABS IMplant       Left 1 Implanted   BUTTON TIGHTROPE ABS RND 20MM - [id number]  BUTTON TIGHTROPE ABS RND 20MM  [employer] [id number] Left 1 Implanted   SYS SWIVELOCK ANCH 4.75X19.1MM - [id number]  SYS SWIVELOCK ANCH 4.75X19.1MM  AR[employer]X [id number] Left 1 Implanted

## 2022-01-12 ENCOUNTER — HOSPITAL ENCOUNTER (OUTPATIENT)
Dept: RADIOLOGY | Facility: HOSPITAL | Age: 32
Discharge: HOME | End: 2022-01-12
Attending: ORTHOPAEDIC SURGERY
Payer: MEDICAID

## 2022-01-12 ENCOUNTER — OFFICE VISIT (OUTPATIENT)
Dept: ORTHOPEDICS | Facility: CLINIC | Age: 32
End: 2022-01-12
Payer: MEDICAID

## 2022-01-12 VITALS
BODY MASS INDEX: 41.95 KG/M2 | WEIGHT: 293 LBS | HEART RATE: 113 BPM | DIASTOLIC BLOOD PRESSURE: 75 MMHG | HEIGHT: 70 IN | SYSTOLIC BLOOD PRESSURE: 140 MMHG

## 2022-01-12 DIAGNOSIS — S83.512D RUPTURE OF ANTERIOR CRUCIATE LIGAMENT OF LEFT KNEE, SUBSEQUENT ENCOUNTER: Primary | ICD-10-CM

## 2022-01-12 DIAGNOSIS — M22.42 CHONDROMALACIA, PATELLA, LEFT: ICD-10-CM

## 2022-01-12 DIAGNOSIS — M25.562 LEFT KNEE PAIN, UNSPECIFIED CHRONICITY: ICD-10-CM

## 2022-01-12 PROCEDURE — 73560 X-RAY EXAM OF KNEE 1 OR 2: CPT | Mod: TC,FY,LT

## 2022-01-12 PROCEDURE — 99024 POSTOP FOLLOW-UP VISIT: CPT | Mod: ,,, | Performed by: PHYSICIAN ASSISTANT

## 2022-01-12 PROCEDURE — 99999 PR PBB SHADOW E&M-EST. PATIENT-LVL III: CPT | Mod: PBBFAC,,, | Performed by: PHYSICIAN ASSISTANT

## 2022-01-12 PROCEDURE — 1160F PR REVIEW ALL MEDS BY PRESCRIBER/CLIN PHARMACIST DOCUMENTED: ICD-10-PCS | Mod: CPTII,,, | Performed by: PHYSICIAN ASSISTANT

## 2022-01-12 PROCEDURE — 1160F RVW MEDS BY RX/DR IN RCRD: CPT | Mod: CPTII,,, | Performed by: PHYSICIAN ASSISTANT

## 2022-01-12 PROCEDURE — 3077F SYST BP >= 140 MM HG: CPT | Mod: CPTII,,, | Performed by: PHYSICIAN ASSISTANT

## 2022-01-12 PROCEDURE — 1159F MED LIST DOCD IN RCRD: CPT | Mod: CPTII,,, | Performed by: PHYSICIAN ASSISTANT

## 2022-01-12 PROCEDURE — 73560 XR KNEE 1 OR 2 VIEW LEFT: ICD-10-PCS | Mod: 26,LT,, | Performed by: STUDENT IN AN ORGANIZED HEALTH CARE EDUCATION/TRAINING PROGRAM

## 2022-01-12 PROCEDURE — 1159F PR MEDICATION LIST DOCUMENTED IN MEDICAL RECORD: ICD-10-PCS | Mod: CPTII,,, | Performed by: PHYSICIAN ASSISTANT

## 2022-01-12 PROCEDURE — 99999 PR PBB SHADOW E&M-EST. PATIENT-LVL III: ICD-10-PCS | Mod: PBBFAC,,, | Performed by: PHYSICIAN ASSISTANT

## 2022-01-12 PROCEDURE — 3008F BODY MASS INDEX DOCD: CPT | Mod: CPTII,,, | Performed by: PHYSICIAN ASSISTANT

## 2022-01-12 PROCEDURE — 3008F PR BODY MASS INDEX (BMI) DOCUMENTED: ICD-10-PCS | Mod: CPTII,,, | Performed by: PHYSICIAN ASSISTANT

## 2022-01-12 PROCEDURE — 99024 PR POST-OP FOLLOW-UP VISIT: ICD-10-PCS | Mod: ,,, | Performed by: PHYSICIAN ASSISTANT

## 2022-01-12 PROCEDURE — 3078F DIAST BP <80 MM HG: CPT | Mod: CPTII,,, | Performed by: PHYSICIAN ASSISTANT

## 2022-01-12 PROCEDURE — 73560 X-RAY EXAM OF KNEE 1 OR 2: CPT | Mod: 26,LT,, | Performed by: STUDENT IN AN ORGANIZED HEALTH CARE EDUCATION/TRAINING PROGRAM

## 2022-01-12 PROCEDURE — 99213 OFFICE O/P EST LOW 20 MIN: CPT | Mod: PBBFAC,PN | Performed by: PHYSICIAN ASSISTANT

## 2022-01-12 PROCEDURE — 3078F PR MOST RECENT DIASTOLIC BLOOD PRESSURE < 80 MM HG: ICD-10-PCS | Mod: CPTII,,, | Performed by: PHYSICIAN ASSISTANT

## 2022-01-12 PROCEDURE — 3077F PR MOST RECENT SYSTOLIC BLOOD PRESSURE >= 140 MM HG: ICD-10-PCS | Mod: CPTII,,, | Performed by: PHYSICIAN ASSISTANT

## 2022-01-12 RX ORDER — CELECOXIB 200 MG/1
200 CAPSULE ORAL 2 TIMES DAILY
Qty: 14 CAPSULE | Refills: 0 | Status: SHIPPED | OUTPATIENT
Start: 2022-01-12 | End: 2024-01-09

## 2022-01-12 NOTE — PROGRESS NOTES
[facility], Orthopedics and Sports Medicine  [facility]    Knee Post-op Visit #1  01/12/2022       Diagnosis:  Rupture of anterior cruciate ligament of left knee  Chondromalacia, patella, left    Morbid obesity with BMI of 45.0-49.9     Procedure:   2/28/2021 Left ACL Reconstruction with Quadriceps Tendon Autograft   Left Patellar Chondroplasty   Subjective:      Ava Parikh is a 31 y.o. female who is now 2 weeks status post left knee surgery. Pain is controlled with current analgesics. Medications being used: Pain controlled by Celebrex but ran out of last prescription. . The patient denies none, fever, wound drainage, increasing redness, pus, increasing pain, increasing swelling. Post op problems reported: none.  Patient states she is having increased pain when she goes to physical therapy. Dr. De Guzman prescribed Celebrex which provided relief, but she ran out of medication. No other complaints.      Objective:      Ortho/SPM Exam  General: alert, appears stated age, cooperative and morbidly obese   Gait: antalgic  Sutures: Sutures in place and will be removed today.  Incision: healing well, no significant drainage, no dehiscence, no significant erythema  Tenderness: mild  Range of Motion: Extension 10 degrees  Flexion 40 degrees  Stability: Normal  Strength: Improving  Vascular: CR<2s. Palpable radial pulse    Imaging:  X-Ray Knee 1 or 2 View Left  Narrative: EXAMINATION:  XR KNEE 1 OR 2 VIEW LEFT    CLINICAL HISTORY:  Pain in left knee    TECHNIQUE:  Two views of left knee    COMPARISON:  Left knee radiograph from 10/31/2021.    FINDINGS:  Interval postoperative changes in the knee.  No acute fracture or dislocation.  No aggressive osseous lesion.  No significant suprapatellar joint effusion.  No significant soft tissue abnormality.  Impression: As above.    Electronically signed by: Saul Ahn  Date:    01/12/2022  Time:    11:10      I have reviewed the above radiograph and  agree with the findings stated by the radiologist.         Assessment:       The patient is two week status post left knee surgery.  The primary encounter diagnosis was Rupture of anterior cruciate ligament of left knee, subsequent encounter. A diagnosis of Chondromalacia, patella, left was also pertinent to this visit. Doing well postoperatively. Will refill Celebrex prescription, Continue WBAT with crutches until 4-6 weeks. Continue knee brace, unlocked for ambulation until 4-6 weeks aglo. Continue physical therapy.  Continuation of post-op rehab course is recommended at this time. All of the patient's questions were answered.       Plan:      1. Continue physical therapy.  2. Weightbearing as tolerated on operative extremity.  3. Follow up at 6 weeks after surgery.   4. Tylenol and celebrex as needed for pain.       Amparo Barcenas PA-C  Department of Orthopedic Surgery  [facility]  Office: [phone]        Orders Placed This Encounter    celecoxib (CELEBREX) 200 MG capsule

## 2022-01-20 PROBLEM — S83.262A PERIPHERAL TEAR OF LATERAL MENISCUS OF LEFT KNEE AS CURRENT INJURY: Status: ACTIVE | Noted: 2022-01-20

## 2022-02-09 ENCOUNTER — OFFICE VISIT (OUTPATIENT)
Dept: ORTHOPEDICS | Facility: CLINIC | Age: 32
End: 2022-02-09
Payer: MEDICAID

## 2022-02-09 VITALS
WEIGHT: 293 LBS | DIASTOLIC BLOOD PRESSURE: 93 MMHG | HEART RATE: 90 BPM | BODY MASS INDEX: 41.95 KG/M2 | SYSTOLIC BLOOD PRESSURE: 166 MMHG | HEIGHT: 70 IN

## 2022-02-09 DIAGNOSIS — S83.512D RUPTURE OF ANTERIOR CRUCIATE LIGAMENT OF LEFT KNEE, SUBSEQUENT ENCOUNTER: Primary | ICD-10-CM

## 2022-02-09 PROCEDURE — 3008F PR BODY MASS INDEX (BMI) DOCUMENTED: ICD-10-PCS | Mod: CPTII,,, | Performed by: ORTHOPAEDIC SURGERY

## 2022-02-09 PROCEDURE — 3077F PR MOST RECENT SYSTOLIC BLOOD PRESSURE >= 140 MM HG: ICD-10-PCS | Mod: CPTII,,, | Performed by: ORTHOPAEDIC SURGERY

## 2022-02-09 PROCEDURE — 3080F PR MOST RECENT DIASTOLIC BLOOD PRESSURE >= 90 MM HG: ICD-10-PCS | Mod: CPTII,,, | Performed by: ORTHOPAEDIC SURGERY

## 2022-02-09 PROCEDURE — 99999 PR PBB SHADOW E&M-EST. PATIENT-LVL III: CPT | Mod: PBBFAC,,, | Performed by: ORTHOPAEDIC SURGERY

## 2022-02-09 PROCEDURE — 3077F SYST BP >= 140 MM HG: CPT | Mod: CPTII,,, | Performed by: ORTHOPAEDIC SURGERY

## 2022-02-09 PROCEDURE — 99213 OFFICE O/P EST LOW 20 MIN: CPT | Mod: PBBFAC,PN | Performed by: ORTHOPAEDIC SURGERY

## 2022-02-09 PROCEDURE — 3008F BODY MASS INDEX DOCD: CPT | Mod: CPTII,,, | Performed by: ORTHOPAEDIC SURGERY

## 2022-02-09 PROCEDURE — 99024 POSTOP FOLLOW-UP VISIT: CPT | Mod: ,,, | Performed by: ORTHOPAEDIC SURGERY

## 2022-02-09 PROCEDURE — 3080F DIAST BP >= 90 MM HG: CPT | Mod: CPTII,,, | Performed by: ORTHOPAEDIC SURGERY

## 2022-02-09 PROCEDURE — 99999 PR PBB SHADOW E&M-EST. PATIENT-LVL III: ICD-10-PCS | Mod: PBBFAC,,, | Performed by: ORTHOPAEDIC SURGERY

## 2022-02-09 PROCEDURE — 99024 PR POST-OP FOLLOW-UP VISIT: ICD-10-PCS | Mod: ,,, | Performed by: ORTHOPAEDIC SURGERY

## 2022-02-09 PROCEDURE — 1159F PR MEDICATION LIST DOCUMENTED IN MEDICAL RECORD: ICD-10-PCS | Mod: CPTII,,, | Performed by: ORTHOPAEDIC SURGERY

## 2022-02-09 PROCEDURE — 1159F MED LIST DOCD IN RCRD: CPT | Mod: CPTII,,, | Performed by: ORTHOPAEDIC SURGERY

## 2022-02-09 NOTE — PROGRESS NOTES
[facility], Orthopedics and Sports Medicine  [facility]    Knee Post-op Visit  02/09/2022       Diagnosis:  Rupture of anterior cruciate ligament of left knee  Chondromalacia, patella, left   Morbid obesity with BMI of 45.0-49.9      Procedure:   2/28/2021 Left ACL Reconstruction with Quadriceps Tendon Autograft, Left Patellar Chondroplasty     Subjective:      Ava Parikh is a 31 y.o. female who is now 6 weeks status post left knee surgery. Pain is controlled without any medications. The patient denies fever, wound drainage, increasing redness, pus, increasing pain, increasing swelling. Post op problems reported: none.    The patient is doing well overall and undergoing therapy as ordered. She has little pain.  She is still limping a bit.  She is no longer using crutches.  No other issues noted.      Objective:      Ortho/SPM Exam  General: alert, appears stated age and cooperative   Gait: antalgic  Sutures: Sutures out.  Incision: healing well, no significant drainage, no dehiscence, no significant erythema  Tenderness: none  Range of Motion: Extension 5 degrees  Flexion 100 degrees  Stability: Normal  Strength: Improving  Vascular: CR<2s. Palpable radial pulse    Imaging:  Radiographs of the left knee taken taken 1/12/2022 were personally reviewed from the [facility]MR.  Multiple views of the knee are available today for review, including a standing AP and lateral view.  The hardware placed for ACL reconstruction is in appropriate position in the femur and tibia.  No acute fractures or dislocations are noted in these images.          Assessment:       The patient is status post left knee surgery.  The encounter diagnosis was Rupture of anterior cruciate ligament of left knee, subsequent encounter. Doing well postoperatively. Continuation of post-op rehab course is recommended at this time. All of the patient's questions were answered.    She is doing well overall.  She can  discontinue the brace at home and at night; she needs to d/c the brace entirely within 2-4 weeks depending on her progress.  She will continue with therapy and follow up at 3 months post op.      Plan:      1. Tylenol 650mg TID PRN for pain.  2. Continue physical therapy.  3. Weightbearing as tolerated on operative extremity.  4. Follow up at 3 months after surgery.       Alex De Guzman IV, MD   of Clinical Orthopedics  Department of Orthopedic Surgery  [facility]  Office: [phone]  Website: [email or web address]

## 2022-03-23 ENCOUNTER — OFFICE VISIT (OUTPATIENT)
Dept: ORTHOPEDICS | Facility: CLINIC | Age: 32
End: 2022-03-23
Payer: MEDICAID

## 2022-03-23 VITALS
HEIGHT: 70 IN | WEIGHT: 293 LBS | SYSTOLIC BLOOD PRESSURE: 139 MMHG | BODY MASS INDEX: 41.95 KG/M2 | DIASTOLIC BLOOD PRESSURE: 84 MMHG | HEART RATE: 96 BPM | TEMPERATURE: 99 F

## 2022-03-23 DIAGNOSIS — M72.2 PLANTAR FASCIITIS OF RIGHT FOOT: ICD-10-CM

## 2022-03-23 DIAGNOSIS — S83.512D RUPTURE OF ANTERIOR CRUCIATE LIGAMENT OF LEFT KNEE, SUBSEQUENT ENCOUNTER: Primary | ICD-10-CM

## 2022-03-23 PROCEDURE — 3008F BODY MASS INDEX DOCD: CPT | Mod: CPTII,,, | Performed by: PHYSICIAN ASSISTANT

## 2022-03-23 PROCEDURE — 1160F PR REVIEW ALL MEDS BY PRESCRIBER/CLIN PHARMACIST DOCUMENTED: ICD-10-PCS | Mod: CPTII,,, | Performed by: PHYSICIAN ASSISTANT

## 2022-03-23 PROCEDURE — 1160F RVW MEDS BY RX/DR IN RCRD: CPT | Mod: CPTII,,, | Performed by: PHYSICIAN ASSISTANT

## 2022-03-23 PROCEDURE — 99213 OFFICE O/P EST LOW 20 MIN: CPT | Mod: PBBFAC,PN | Performed by: PHYSICIAN ASSISTANT

## 2022-03-23 PROCEDURE — 1159F MED LIST DOCD IN RCRD: CPT | Mod: CPTII,,, | Performed by: PHYSICIAN ASSISTANT

## 2022-03-23 PROCEDURE — 3075F PR MOST RECENT SYSTOLIC BLOOD PRESS GE 130-139MM HG: ICD-10-PCS | Mod: CPTII,,, | Performed by: PHYSICIAN ASSISTANT

## 2022-03-23 PROCEDURE — 99999 PR PBB SHADOW E&M-EST. PATIENT-LVL III: ICD-10-PCS | Mod: PBBFAC,,, | Performed by: PHYSICIAN ASSISTANT

## 2022-03-23 PROCEDURE — 99999 PR PBB SHADOW E&M-EST. PATIENT-LVL III: CPT | Mod: PBBFAC,,, | Performed by: PHYSICIAN ASSISTANT

## 2022-03-23 PROCEDURE — 3075F SYST BP GE 130 - 139MM HG: CPT | Mod: CPTII,,, | Performed by: PHYSICIAN ASSISTANT

## 2022-03-23 PROCEDURE — 3008F PR BODY MASS INDEX (BMI) DOCUMENTED: ICD-10-PCS | Mod: CPTII,,, | Performed by: PHYSICIAN ASSISTANT

## 2022-03-23 PROCEDURE — 3079F DIAST BP 80-89 MM HG: CPT | Mod: CPTII,,, | Performed by: PHYSICIAN ASSISTANT

## 2022-03-23 PROCEDURE — 99024 PR POST-OP FOLLOW-UP VISIT: ICD-10-PCS | Mod: ,,, | Performed by: PHYSICIAN ASSISTANT

## 2022-03-23 PROCEDURE — 99024 POSTOP FOLLOW-UP VISIT: CPT | Mod: ,,, | Performed by: PHYSICIAN ASSISTANT

## 2022-03-23 PROCEDURE — 3079F PR MOST RECENT DIASTOLIC BLOOD PRESSURE 80-89 MM HG: ICD-10-PCS | Mod: CPTII,,, | Performed by: PHYSICIAN ASSISTANT

## 2022-03-23 PROCEDURE — 1159F PR MEDICATION LIST DOCUMENTED IN MEDICAL RECORD: ICD-10-PCS | Mod: CPTII,,, | Performed by: PHYSICIAN ASSISTANT

## 2022-03-23 RX ORDER — ACETAMINOPHEN 325 MG/1
325 TABLET ORAL EVERY 6 HOURS PRN
COMMUNITY
End: 2024-01-09

## 2022-03-23 NOTE — PROGRESS NOTES
[facility], Orthopedics and Sports Medicine  [facility]    Knee Post-op Visit  03/23/2022       Diagnosis:  Rupture of anterior cruciate ligament of left knee  Chondromalacia, patella, left    Morbid obesity with BMI of 45.0-49.9   Procedure:   2/28/2021 Left ACL Reconstruction with Quadriceps Tendon Autograft   Left Patellar Chondroplasty     Subjective:      Ava Parikh is a 31 y.o. female who is now 3 months status post left knee surgery. Pain is controlled without any medications. Takes tylenol PRN.  The patient denies none, fever, wound drainage, increasing redness, pus, increasing pain, increasing swelling. Post op problems reported: none.  Patient states she is still walking with a slight limp. Notes continued quad weakness and quad pain. Is requesting more PT visits.      Objective:      Ortho/SPM Exam  General: alert, appears stated age, cooperative and moderately obese   Gait: normal  Sutures: Sutures out.  Incision: healing well, no significant drainage, no dehiscence, no significant erythema  Tenderness: quad tendon insertion and patella tendon tenderness   Range of Motion: Extension 10 degrees  Flexion 120 degrees  Stability: Normal  Strength: Improving. Continued quad weakness. 4/5.  Vascular: CR<2s. Palpable radial pulse    Imaging:  No imaging taken today.       Assessment:       The patient is status post left knee surgery.  The primary encounter diagnosis was Rupture of anterior cruciate ligament of left knee, subsequent encounter. A diagnosis of Plantar fasciitis of right foot was also pertinent to this visit. Doing well postoperatively. Continuation of post-op rehab course is recommended at this time. All of the patient's questions were answered.  Recommend continuation of physical therapy to work on quad strengthening.  Weightbearing as tolerated.     Plan:      1. Tylenol 650mg TID PRN for pain.  2. Continue physical therapy.  3. Weightbearing as tolerated on  operative extremity.  4. Follow up as needed. in 3 mon. Consider injection at that visit if still having significant pain.        Amparo Barcenas PA-C  Department of Orthopedic Surgery  [facility]  Office: [phone]        Orders Placed This Encounter    Ambulatory referral/consult to Physical/Occupational Therapy

## 2022-05-11 ENCOUNTER — TELEPHONE (OUTPATIENT)
Dept: ORTHOPEDICS | Facility: CLINIC | Age: 32
End: 2022-05-11
Payer: MEDICAID

## 2022-05-11 NOTE — TELEPHONE ENCOUNTER
----- Message from Pat Gutierres sent at 5/11/2022  9:24 AM CDT -----  Contact: pt  Type:  Patient Returning Call    Who Called:pt  Who Left Message for Patient Manuela  Does the patient know what this is regarding?: appt  Would the patient rather a call back or a response via "[employer]ner? call  Best Call Back Number:[phone]  Additional Information:

## 2022-06-13 ENCOUNTER — TELEPHONE (OUTPATIENT)
Dept: ORTHOPEDICS | Facility: CLINIC | Age: 32
End: 2022-06-13
Payer: MEDICAID

## 2022-06-13 NOTE — TELEPHONE ENCOUNTER
----- Message from Staci Mcdowell sent at 6/13/2022  9:52 AM CDT -----  Contact: [phone]  Type:  Sooner Apoointment Request    Caller is requesting a sooner appointment.  Caller declined first available appointment listed below.  Caller will not accept being placed on the waitlist and is requesting a message be sent to doctor.  Name of Caller:pt called  When is the first available appointment?06/20/2022  Symptoms:swelling and pain L knee  Would the patient rather a call back or a response via [facility]? Call back  Best Call Back Number:[phone]  Additional Information:

## 2022-06-14 ENCOUNTER — OFFICE VISIT (OUTPATIENT)
Dept: ORTHOPEDICS | Facility: CLINIC | Age: 32
End: 2022-06-14
Payer: MEDICAID

## 2022-06-14 VITALS
SYSTOLIC BLOOD PRESSURE: 127 MMHG | HEART RATE: 97 BPM | DIASTOLIC BLOOD PRESSURE: 87 MMHG | WEIGHT: 293 LBS | HEIGHT: 70 IN | BODY MASS INDEX: 41.95 KG/M2

## 2022-06-14 DIAGNOSIS — S83.512D RUPTURE OF ANTERIOR CRUCIATE LIGAMENT OF LEFT KNEE, SUBSEQUENT ENCOUNTER: ICD-10-CM

## 2022-06-14 DIAGNOSIS — M23.92 INTERNAL DERANGEMENT OF LEFT KNEE: Primary | ICD-10-CM

## 2022-06-14 PROCEDURE — 99214 PR OFFICE/OUTPT VISIT, EST, LEVL IV, 30-39 MIN: ICD-10-PCS | Mod: 25,S$PBB,, | Performed by: PHYSICIAN ASSISTANT

## 2022-06-14 PROCEDURE — 99999 PR PBB SHADOW E&M-EST. PATIENT-LVL III: ICD-10-PCS | Mod: PBBFAC,,, | Performed by: PHYSICIAN ASSISTANT

## 2022-06-14 PROCEDURE — 99999 PR PBB SHADOW E&M-EST. PATIENT-LVL III: CPT | Mod: PBBFAC,,, | Performed by: PHYSICIAN ASSISTANT

## 2022-06-14 PROCEDURE — 99214 OFFICE O/P EST MOD 30 MIN: CPT | Mod: 25,S$PBB,, | Performed by: PHYSICIAN ASSISTANT

## 2022-06-14 PROCEDURE — 3079F PR MOST RECENT DIASTOLIC BLOOD PRESSURE 80-89 MM HG: ICD-10-PCS | Mod: CPTII,,, | Performed by: PHYSICIAN ASSISTANT

## 2022-06-14 PROCEDURE — 1160F RVW MEDS BY RX/DR IN RCRD: CPT | Mod: CPTII,,, | Performed by: PHYSICIAN ASSISTANT

## 2022-06-14 PROCEDURE — 3008F BODY MASS INDEX DOCD: CPT | Mod: CPTII,,, | Performed by: PHYSICIAN ASSISTANT

## 2022-06-14 PROCEDURE — 20610 DRAIN/INJ JOINT/BURSA W/O US: CPT | Mod: S$PBB,LT,, | Performed by: PHYSICIAN ASSISTANT

## 2022-06-14 PROCEDURE — 3079F DIAST BP 80-89 MM HG: CPT | Mod: CPTII,,, | Performed by: PHYSICIAN ASSISTANT

## 2022-06-14 PROCEDURE — 3008F PR BODY MASS INDEX (BMI) DOCUMENTED: ICD-10-PCS | Mod: CPTII,,, | Performed by: PHYSICIAN ASSISTANT

## 2022-06-14 PROCEDURE — 20610 LARGE JOINT ASPIRATION/INJECTION: L KNEE: ICD-10-PCS | Mod: S$PBB,LT,, | Performed by: PHYSICIAN ASSISTANT

## 2022-06-14 PROCEDURE — 1159F MED LIST DOCD IN RCRD: CPT | Mod: CPTII,,, | Performed by: PHYSICIAN ASSISTANT

## 2022-06-14 PROCEDURE — 99213 OFFICE O/P EST LOW 20 MIN: CPT | Mod: PBBFAC,PN,25 | Performed by: PHYSICIAN ASSISTANT

## 2022-06-14 PROCEDURE — 20610 DRAIN/INJ JOINT/BURSA W/O US: CPT | Mod: PBBFAC,PN | Performed by: PHYSICIAN ASSISTANT

## 2022-06-14 PROCEDURE — 1159F PR MEDICATION LIST DOCUMENTED IN MEDICAL RECORD: ICD-10-PCS | Mod: CPTII,,, | Performed by: PHYSICIAN ASSISTANT

## 2022-06-14 PROCEDURE — 1160F PR REVIEW ALL MEDS BY PRESCRIBER/CLIN PHARMACIST DOCUMENTED: ICD-10-PCS | Mod: CPTII,,, | Performed by: PHYSICIAN ASSISTANT

## 2022-06-14 PROCEDURE — 3074F PR MOST RECENT SYSTOLIC BLOOD PRESSURE < 130 MM HG: ICD-10-PCS | Mod: CPTII,,, | Performed by: PHYSICIAN ASSISTANT

## 2022-06-14 PROCEDURE — 3074F SYST BP LT 130 MM HG: CPT | Mod: CPTII,,, | Performed by: PHYSICIAN ASSISTANT

## 2022-06-14 RX ORDER — TRIAMCINOLONE ACETONIDE 40 MG/ML
40 INJECTION, SUSPENSION INTRA-ARTICULAR; INTRAMUSCULAR
Status: DISCONTINUED | OUTPATIENT
Start: 2022-06-14 | End: 2022-06-14 | Stop reason: HOSPADM

## 2022-06-14 RX ADMIN — TRIAMCINOLONE ACETONIDE 40 MG: 40 INJECTION, SUSPENSION INTRA-ARTICULAR; INTRAMUSCULAR at 01:06
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
## 2022-06-21 ENCOUNTER — PATIENT MESSAGE (OUTPATIENT)
Dept: ORTHOPEDICS | Facility: CLINIC | Age: 32
End: 2022-06-21
Payer: MEDICAID

## 2022-06-24 ENCOUNTER — PATIENT MESSAGE (OUTPATIENT)
Dept: ORTHOPEDICS | Facility: CLINIC | Age: 32
End: 2022-06-24
Payer: MEDICAID

## 2024-01-17 PROBLEM — K80.20 SYMPTOMATIC CHOLELITHIASIS: Status: ACTIVE | Noted: 2024-01-17

## (undated) DEVICE — PAD KNEE POLAR XL

## (undated) DEVICE — SUT BLU BR 2 TAPERD NDL 1/2

## (undated) DEVICE — SUT FIBERWIRE 2-0 50 BLK

## (undated) DEVICE — MAT SUCTION PUDDLEVAC ORANGE

## (undated) DEVICE — SEE L#120831

## (undated) DEVICE — DRESSING XEROFORM FOIL PK 1X8

## (undated) DEVICE — SEE MEDLINE ITEM 157116

## (undated) DEVICE — BLADE SURG #15 CARBON STEEL

## (undated) DEVICE — TOURNIQUET SB QC DP 44X4IN

## (undated) DEVICE — GOWN SURGICAL XX LARGE X LONG

## (undated) DEVICE — TAPE MEDIPORE 4IN X 2YDS

## (undated) DEVICE — SUPPORT ULNA NERVE PROTECTOR

## (undated) DEVICE — SUT ETHILON 3-0 PS2 18 BLK

## (undated) DEVICE — TOWEL OR DISP STRL BLUE 4/PK

## (undated) DEVICE — PADDING WYTEX UNDRCST 6INX4YD

## (undated) DEVICE — GLOVE BIOGEL PIMICRO INDIC 8.5

## (undated) DEVICE — PAD ABDOMINAL 5X9 STERILE

## (undated) DEVICE — NDL SUREFIRE SCORPION RC

## (undated) DEVICE — APPLICATOR CHLORAPREP ORN 26ML

## (undated) DEVICE — NDL 22GA X1 1/2 REG BEVEL

## (undated) DEVICE — SUT FIBERWIRE 2 50IN BLUE

## (undated) DEVICE — BLADE SURG CARBON STEEL SZ11

## (undated) DEVICE — PACK BASIC

## (undated) DEVICE — COVER BACK TABLE 72X21

## (undated) DEVICE — DRAPE PLASTIC U 60X72

## (undated) DEVICE — SUT 2/0 36IN COATED VICRYL

## (undated) DEVICE — BRACE KNEE T SCOPE PREMIER

## (undated) DEVICE — SEE MEDLINE ITEM 157216

## (undated) DEVICE — BANDAGE ELAS SOFTWRAP ST 6X5YD

## (undated) DEVICE — GAUZE SPONGE 4X4 12PLY

## (undated) DEVICE — Device

## (undated) DEVICE — BLADE SHAVER 4.5 6/BX

## (undated) DEVICE — SEE MEDLINE ITEM 157117

## (undated) DEVICE — SOL IRR NACL .9% 3000ML

## (undated) DEVICE — TUBE SET INFLOW/OUTFLOW

## (undated) DEVICE — BANDAGE ESMARK ELASTIC ST 6X9

## (undated) DEVICE — DRAPE STERI INSTRUMENT 1018

## (undated) DEVICE — TUBING SUC UNIV W/CONN 12FT

## (undated) DEVICE — SPONGE LAP 18X18 PREWASHED

## (undated) DEVICE — NDL SPINAL 18GX3.5 SPINOCAN

## (undated) DEVICE — SEE MEDLINE ITEM 157131

## (undated) DEVICE — GLOVE BIOGEL SKINSENSE PI 8.5

## (undated) DEVICE — COVER OVERHEAD SURG LT BLUE

## (undated) DEVICE — BNDG COFLEX FOAM LF2 ST 6X5YD

## (undated) DEVICE — ALCOHOL 70% ISOP W/GREEN 16OZ

## (undated) DEVICE — SEE MEDLINE ITEM 156955

## (undated) DEVICE — DRAPE C-ARM/MOBILE XRAY 44X80

## (undated) DEVICE — DRESSING AQUACEL SACRAL 9 X 9

## (undated) DEVICE — MANIFOLD 4 PORT

## (undated) DEVICE — BANDAGE MATRIX HK LOOP 6IN 5YD

## (undated) DEVICE — SHAVER ULTRAFFR 4.2MM

## (undated) DEVICE — ELECTRODE REM PLYHSV RETURN 9

## (undated) DEVICE — DRAPE EMERALD 87X114.75X113

## (undated) DEVICE — DRILL FLIPCUTTER III

## (undated) DEVICE — SEE MEDLINE ITEM 146292

## (undated) DEVICE — CLOSURE SKIN STERI STRIP 1/2X4

## (undated) DEVICE — SUT FIBERWIRE LOOP TIGER 2

## (undated) DEVICE — DRAPE SURG W/TWL 17 5/8X23

## (undated) DEVICE — SUT VICRYL 3-0 27 SH